# Patient Record
Sex: FEMALE | Race: WHITE | NOT HISPANIC OR LATINO | Employment: FULL TIME | URBAN - METROPOLITAN AREA
[De-identification: names, ages, dates, MRNs, and addresses within clinical notes are randomized per-mention and may not be internally consistent; named-entity substitution may affect disease eponyms.]

---

## 2018-01-30 ENCOUNTER — OFFICE VISIT CONVERTED (OUTPATIENT)
Dept: FAMILY MEDICINE CLINIC | Facility: CLINIC | Age: 38
End: 2018-01-30
Attending: NURSE PRACTITIONER

## 2018-02-27 ENCOUNTER — OFFICE VISIT CONVERTED (OUTPATIENT)
Dept: FAMILY MEDICINE CLINIC | Facility: CLINIC | Age: 38
End: 2018-02-27
Attending: NURSE PRACTITIONER

## 2018-02-27 ENCOUNTER — CONVERSION ENCOUNTER (OUTPATIENT)
Dept: FAMILY MEDICINE CLINIC | Facility: CLINIC | Age: 38
End: 2018-02-27

## 2018-10-22 ENCOUNTER — OFFICE VISIT CONVERTED (OUTPATIENT)
Dept: FAMILY MEDICINE CLINIC | Facility: CLINIC | Age: 38
End: 2018-10-22
Attending: NURSE PRACTITIONER

## 2019-01-15 ENCOUNTER — HOSPITAL ENCOUNTER (OUTPATIENT)
Dept: OTHER | Facility: HOSPITAL | Age: 39
Discharge: HOME | End: 2019-01-15

## 2019-04-30 ENCOUNTER — OFFICE VISIT CONVERTED (OUTPATIENT)
Dept: FAMILY MEDICINE CLINIC | Facility: CLINIC | Age: 39
End: 2019-04-30
Attending: NURSE PRACTITIONER

## 2019-11-11 ENCOUNTER — OFFICE VISIT CONVERTED (OUTPATIENT)
Dept: FAMILY MEDICINE CLINIC | Facility: CLINIC | Age: 39
End: 2019-11-11
Attending: NURSE PRACTITIONER

## 2020-01-02 ENCOUNTER — HOSPITAL ENCOUNTER (OUTPATIENT)
Dept: OTHER | Facility: HOSPITAL | Age: 40
Discharge: HOME | End: 2020-01-02

## 2020-05-04 ENCOUNTER — TELEPHONE CONVERTED (OUTPATIENT)
Dept: FAMILY MEDICINE CLINIC | Facility: CLINIC | Age: 40
End: 2020-05-04
Attending: NURSE PRACTITIONER

## 2020-06-04 ENCOUNTER — HOSPITAL ENCOUNTER (OUTPATIENT)
Dept: URGENT CARE | Facility: CLINIC | Age: 40
Discharge: HOME | End: 2020-06-04
Attending: NURSE PRACTITIONER

## 2020-06-05 LAB — SARS-COV-2 RNA SPEC QL NAA+PROBE: NOT DETECTED

## 2020-09-10 ENCOUNTER — HOSPITAL ENCOUNTER (OUTPATIENT)
Dept: OTHER | Facility: HOSPITAL | Age: 40
Discharge: HOME | End: 2020-09-10
Attending: NURSE PRACTITIONER

## 2020-09-11 LAB
CONV MUMPS ANTIBODY IGG: <9 AU/ML
HBV SURFACE AB SER QL: REACTIVE
MEV IGG SER IA-ACNC: 15.7 AU/ML
RUBV IGG SER-ACNC: 31.5 [IU]/ML
VZV IGG SER IA-ACNC: >4000 INDEX

## 2020-11-02 ENCOUNTER — OFFICE VISIT CONVERTED (OUTPATIENT)
Dept: FAMILY MEDICINE CLINIC | Facility: CLINIC | Age: 40
End: 2020-11-02
Attending: NURSE PRACTITIONER

## 2021-01-11 ENCOUNTER — HOSPITAL ENCOUNTER (OUTPATIENT)
Dept: LAB | Facility: HOSPITAL | Age: 41
Discharge: HOME | End: 2021-01-11

## 2021-01-11 LAB
ALBUMIN SERPL-MCNC: 4.1 G/DL (ref 3.5–5)
ALBUMIN/GLOB SERPL: 1.6 {RATIO} (ref 1.4–2.6)
ALP SERPL-CCNC: 38 U/L (ref 42–98)
ALT SERPL-CCNC: 8 U/L (ref 10–40)
ANION GAP SERPL CALC-SCNC: 13 MMOL/L (ref 8–19)
AST SERPL-CCNC: 11 U/L (ref 15–50)
BASOPHILS # BLD AUTO: 0.04 10*3/UL (ref 0–0.2)
BASOPHILS NFR BLD AUTO: 1 % (ref 0–3)
BILIRUB SERPL-MCNC: 0.33 MG/DL (ref 0.2–1.3)
BUN SERPL-MCNC: 10 MG/DL (ref 5–25)
BUN/CREAT SERPL: 11 {RATIO} (ref 6–20)
CALCIUM SERPL-MCNC: 8.6 MG/DL (ref 8.7–10.4)
CHLORIDE SERPL-SCNC: 108 MMOL/L (ref 99–111)
CHOLEST SERPL-MCNC: 182 MG/DL (ref 107–200)
CHOLEST/HDLC SERPL: 2.8 {RATIO} (ref 3–6)
CONV ABS IMM GRAN: 0.01 10*3/UL (ref 0–0.2)
CONV CO2: 23 MMOL/L (ref 22–32)
CONV IMMATURE GRAN: 0.2 % (ref 0–1.8)
CONV TOTAL PROTEIN: 6.6 G/DL (ref 6.3–8.2)
CREAT UR-MCNC: 0.89 MG/DL (ref 0.5–0.9)
DEPRECATED RDW RBC AUTO: 43.2 FL (ref 36.4–46.3)
EOSINOPHIL # BLD AUTO: 0.03 10*3/UL (ref 0–0.7)
EOSINOPHIL # BLD AUTO: 0.7 % (ref 0–7)
ERYTHROCYTE [DISTWIDTH] IN BLOOD BY AUTOMATED COUNT: 12.4 % (ref 11.7–14.4)
GFR SERPLBLD BASED ON 1.73 SQ M-ARVRAT: >60 ML/MIN/{1.73_M2}
GLOBULIN UR ELPH-MCNC: 2.5 G/DL (ref 2–3.5)
GLUCOSE SERPL-MCNC: 91 MG/DL (ref 65–99)
HCT VFR BLD AUTO: 35.4 % (ref 37–47)
HDLC SERPL-MCNC: 66 MG/DL (ref 40–60)
HGB BLD-MCNC: 11.8 G/DL (ref 12–16)
LDLC SERPL CALC-MCNC: 105 MG/DL (ref 70–100)
LYMPHOCYTES # BLD AUTO: 1.64 10*3/UL (ref 1–5)
LYMPHOCYTES NFR BLD AUTO: 40.6 % (ref 20–45)
MCH RBC QN AUTO: 32.2 PG (ref 27–31)
MCHC RBC AUTO-ENTMCNC: 33.3 G/DL (ref 33–37)
MCV RBC AUTO: 96.5 FL (ref 81–99)
MONOCYTES # BLD AUTO: 0.41 10*3/UL (ref 0.2–1.2)
MONOCYTES NFR BLD AUTO: 10.1 % (ref 3–10)
NEUTROPHILS # BLD AUTO: 1.91 10*3/UL (ref 2–8)
NEUTROPHILS NFR BLD AUTO: 47.4 % (ref 30–85)
NRBC CBCN: 0 % (ref 0–0.7)
OSMOLALITY SERPL CALC.SUM OF ELEC: 289 MOSM/KG (ref 273–304)
PLATELET # BLD AUTO: 173 10*3/UL (ref 130–400)
PMV BLD AUTO: 12.5 FL (ref 9.4–12.3)
POTASSIUM SERPL-SCNC: 3.5 MMOL/L (ref 3.5–5.3)
RBC # BLD AUTO: 3.67 10*6/UL (ref 4.2–5.4)
SODIUM SERPL-SCNC: 140 MMOL/L (ref 135–147)
TRIGL SERPL-MCNC: 56 MG/DL (ref 40–150)
TSH SERPL-ACNC: 2.98 M[IU]/L (ref 0.27–4.2)
VLDLC SERPL-MCNC: 11 MG/DL (ref 5–37)
WBC # BLD AUTO: 4.04 10*3/UL (ref 4.8–10.8)

## 2021-05-13 NOTE — PROGRESS NOTES
Progress Note      Patient Name: Marie Stout   Patient ID: [id number]   Sex: Female   YOB: 1980    Primary Care Provider: Jackie CARRION    Visit Date: November 2, 2020    Provider: SHEYLA Rocha   Location: [facility]   Location Address: [address]  [place], [place]  [id number]   Location Phone: [phone]          Chief Complaint     Follow up and med refills       History Of Present Illness  Marie Stout is a 40 year old /White female who presents for evaluation and treatment of:      She is doing good.  She is at [facility] and feels good (registering)  She is doing well with the Wellbutrin at the low dose.  She gets overwhelms at times.    She has not had her labs--she will get her labs done in Jan.    She runs for peace.  She is going to the the marathon.    She is doing good with headaches.  No issues.  She stopped school for resp due to the stress.  She is off birth control due to the vacation.  She stopped and feels good.       Past Medical History  Disease Name Date Onset Notes   Anxiety --  --    Depression --  --    Generalized anxiety disorder 10/22/2018 --    Major depressive disorder 10/22/2018 --    Migraine headache --  --          Medication List  Name Date Started Instructions   bupropion HCl 100 mg oral tablet 11/02/2020 TAKE 1 TABLET BY MOUTH THREE TIMES DAILY for 30 days   diolex 250mg 250 mg  1 tab po bid   Keppra 500 mg oral tablet  take 3 tablets by oral route daily   Klonopin 0.5 mg oral tablet  take 1 tablet (0.5 mg) by oral route 2 times per day   Topamax 100 mg oral tablet  take 1 tablet (100 mg) by oral route 2 times per day         Allergy List  Allergen Name Date Reaction Notes   NO KNOWN DRUG ALLERGIES --  --  --        Allergies Reconciled  Family Medical History  Disease Name Relative/Age Notes   Diabetes Mellitus, Type II Father/  Mother/   --          Reproductive History  Menstrual   Pregnancy Summary  "  Total Pregnancies: 2 Full Term: 0 Premature: 0   Ab Induced: 0 Ab Spontaneous: 0 Ectopics: 0   Multiples: 0 Livin         Social History  Finding Status Start/Stop Quantity Notes   Alcohol Never --/-- --  --    Exercises regularly --  --/-- --  --    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[facility]    --  --/-- --  --    Non-smoker --  --/-- --  2018 -    Tobacco Never --/-- --  --          Immunizations  NameDate Admin Mfg Trade Name Lot Number Route Inj VIS Given VIS Publication   [id number] SKB Fluarix, quadrivalent, preservative free  NE NE 2020    Comments:          Review of Systems  · Constitutional  o Denies  o : fever, fatigue, weight loss, weight gain  · Cardiovascular  o Denies  o : lower extremity edema, claudication, chest pressure, palpitations  · Respiratory  o Denies  o : shortness of breath, wheezing, cough, hemoptysis, dyspnea on exertion  · Gastrointestinal  o Denies  o : nausea, vomiting, diarrhea, constipation, abdominal pain  · Psychiatric  o Admits  o : anxiety, depression  o Denies  o : suicidal ideation, homicidal ideation      Vitals  Date Time BP Position Site L\R Cuff Size HR RR TEMP (F) WT  HT  BMI kg/m2 BSA m2 O2 Sat FR L/min FiO2        2020 08:40 /72 Sitting    82 - R 12 97.8 110lbs 8oz 5'  3\" 19.57 1.49 100 %            Physical Examination  · Constitutional  o Appearance  o : well-nourished, well developed, alert, in no acute distress  · Respiratory  o Respiratory Effort  o : breathing unlabored  o Auscultation of Lungs  o : normal breath sounds throughout  · Cardiovascular  o Heart  o :   § Auscultation of Heart  § : regular rate and rhythm, no murmurs, gallops or rubs  § Palpation of Heart  § : normal apical impulse, no cardiac thrill present  · Neurologic  o Mental Status Examination  o :   § Orientation  § : grossly oriented to person, place and time  · Psychiatric  o Mood and Affect  o : mood normal, affect appropriate, denies any " SI/HI          Assessment  · Generalized anxiety disorder     300.02/F41.1  · Mild episode of recurrent major depressive disorder     296.31/F33.0  · Screening for depression     V79.0/Z13.89      Plan  · Orders  o ACO-18: Negative screen for clinical depression using a standardized tool () - V79.0/Z13.89 - 11/02/2020  o ACO-39: Current medications updated and reviewed () - - 11/02/2020  o ACO-14: Influenza immunization administered or previously received () - - 11/02/2020  · Medications  o bupropion HCl 100 mg oral tablet   SIG: TAKE 1 TABLET BY MOUTH THREE TIMES DAILY for 30 days   DISP: (90) Tablet with 5 refills  Refilled on 11/02/2020     · Instructions  o Depression Screen completed and scanned into the EMR under the designated folder within the patient's documents.  o PHQ-9 results 3 stable on meds.  o Patient was educated/instructed on their diagnosis, treatment and medications prior to discharge from the clinic today.  o Patient instructed to seek medical attention urgently for new or worsening symptoms.  o Call the office with any concerns or questions.  o Call with any concerns or questions. F.U in 6 months. Any SI/HI seek help immed. She declines pap.   o Electronically Identified Patient Education Materials Provided Electronically  · Disposition  o Call or Return if symptoms worsen or persist.  o Return Visit Request in/on 6 months +/- 2 days ([id number]).            Electronically Signed by: SHEYLA Rocha -Author on November 2, 2020 09:05:02 AM

## 2021-05-13 NOTE — PROGRESS NOTES
Quick Note      Patient Name: Marie Stout   Patient ID: [id number]   Sex: Female   YOB: 1980    Primary Care Provider: Jackie CARRION    Visit Date: May 4, 2020    Provider: SHEYLA Rocha   Location: Replaced by [facility]   Location Address: [facility] [address]  [place], [place]  [id number]   Location Phone: [phone]          History Of Present Illness  TELEHEALTH TELEPHONE VISIT  Chief Complaint: f.u for med   Marie Stout is a 40 year old /White female who is presenting for evaluation via telehealth telephone visit. Verbal consent obtained before beginning visit.   Provider spent 9:40-9:52 minutes with the patient during telehealth visit.   The following staff were present during this visit: pt, and self(ANTONIOW)   Past Medical History/Overview of Patient Symptoms  Marie Stout is a 40 year old /White female who presents for evaluation and treatment of:      She is very active.  She is taking her birth control and she runs.  She knows that her bc is working but she may have a light cycle one month and then ok the next.  She is an avid runner.  She is doing well with the Wellbutrin.  NO issues and no SI/HI Noted.  She is seeing Dr Aldrich in [place] for her refills.  She has not had any seizure activity in the last 1 yr. No headache issues noted.  She feels good overall.           Assessment  · Generalized anxiety disorder     300.02/F41.1  · Mild episode of recurrent major depressive disorder     296.31/F33.0  · Birth control counseling     V25.09/Z30.09      Plan  · Orders  o Physician Telephone Evaluation, 11-20 minutes ([id number]) - - 05/04/2020  o ACO-39: Current medications updated and reviewed () - - 05/04/2020  o ACO-14: Influenza immunization administered or previously received () - - 05/04/2020  · Medications  o bupropion HCl 100 mg oral tablet   SIG: TAKE 1 TABLET BY MOUTH THREE TIMES DAILY for 30 days   DISP: (90) Tablet with 5 refills  Refilled on  05/04/2020     o Tri-Lo-Sprintec 0.18/0.215/0.25 mg-25 mcg oral tablet   SIG: TAKE 1 TABLET BY MOUTH EVERY DAY   DISP: (28) Tablet with 5 refills  Refilled on 05/04/2020     o buspirone 5 mg oral tablet   SIG: take 1 tablet by oral route daily as needed   DISP: (30) tablets with 5 refills  Discontinued on 05/04/2020     o Medications have been Reconciled  o Transition of Care or Provider Policy  · Instructions  o Plan Of Care:   o Take all medications as prescribed/directed.  o Call the office with any concerns or questions.  o She will do pap in 6 months. We will do labs (fasting if she wants) in 6 months. She is due cmp/cbc due for Birth control. Call with any concerns or questions. She is still seeing neuro and is due in June to see him.   o Electronically Identified Patient Education Materials Provided Electronically  · Disposition  o Call or Return if symptoms worsen or persist.  o Return Visit Request in/on 6 months +/- 2 days ([id number]).            Electronically Signed by: SHEYLA Rocha -Author on May 4, 2020 09:54:43 AM

## 2021-05-14 VITALS
SYSTOLIC BLOOD PRESSURE: 109 MMHG | TEMPERATURE: 97.8 F | RESPIRATION RATE: 12 BRPM | DIASTOLIC BLOOD PRESSURE: 72 MMHG | HEIGHT: 63 IN | WEIGHT: 110.5 LBS | OXYGEN SATURATION: 100 % | BODY MASS INDEX: 19.58 KG/M2 | HEART RATE: 82 BPM

## 2021-05-15 VITALS
TEMPERATURE: 98.6 F | SYSTOLIC BLOOD PRESSURE: 114 MMHG | HEART RATE: 79 BPM | DIASTOLIC BLOOD PRESSURE: 80 MMHG | RESPIRATION RATE: 12 BRPM | OXYGEN SATURATION: 100 % | HEIGHT: 63 IN | WEIGHT: 111.06 LBS | BODY MASS INDEX: 19.68 KG/M2

## 2021-05-15 VITALS
WEIGHT: 107.19 LBS | HEIGHT: 63 IN | RESPIRATION RATE: 12 BRPM | OXYGEN SATURATION: 100 % | HEART RATE: 76 BPM | DIASTOLIC BLOOD PRESSURE: 82 MMHG | BODY MASS INDEX: 18.99 KG/M2 | SYSTOLIC BLOOD PRESSURE: 110 MMHG | TEMPERATURE: 98.2 F

## 2021-05-16 VITALS
RESPIRATION RATE: 12 BRPM | BODY MASS INDEX: 19.36 KG/M2 | SYSTOLIC BLOOD PRESSURE: 108 MMHG | BODY MASS INDEX: 20.23 KG/M2 | SYSTOLIC BLOOD PRESSURE: 114 MMHG | HEIGHT: 63 IN | DIASTOLIC BLOOD PRESSURE: 76 MMHG | SYSTOLIC BLOOD PRESSURE: 114 MMHG | WEIGHT: 114.19 LBS | WEIGHT: 109.25 LBS | OXYGEN SATURATION: 100 % | TEMPERATURE: 98.9 F | BODY MASS INDEX: 19.57 KG/M2 | OXYGEN SATURATION: 100 % | HEIGHT: 63 IN | OXYGEN SATURATION: 100 % | HEART RATE: 72 BPM | RESPIRATION RATE: 16 BRPM | DIASTOLIC BLOOD PRESSURE: 54 MMHG | HEART RATE: 81 BPM | TEMPERATURE: 98.6 F | WEIGHT: 110.44 LBS | HEIGHT: 63 IN | DIASTOLIC BLOOD PRESSURE: 76 MMHG | HEART RATE: 80 BPM | RESPIRATION RATE: 12 BRPM | TEMPERATURE: 98.4 F

## 2021-06-02 ENCOUNTER — OFFICE VISIT CONVERTED (OUTPATIENT)
Dept: FAMILY MEDICINE CLINIC | Facility: CLINIC | Age: 41
End: 2021-06-02
Attending: NURSE PRACTITIONER

## 2021-06-02 ENCOUNTER — CONVERSION ENCOUNTER (OUTPATIENT)
Dept: FAMILY MEDICINE CLINIC | Facility: CLINIC | Age: 41
End: 2021-06-02

## 2021-06-05 NOTE — PROGRESS NOTES
Progress Note      Patient Name: Marie Stout   Patient ID: [id number]   Sex: Female   YOB: 1980    Primary Care Provider: Jackie CARRION    Visit Date: June 2, 2021    Provider: SHEYLA Rocha   Location: [facility]   Location Address: [address]  [place], [place]  [id number]   Location Phone: [phone]          Chief Complaint     Follow up and med refills       History Of Present Illness  Marie Stout is a 41 year old /White female who presents for evaluation and treatment of:      She is doing good.  She is at [facility] and feels good (registering)  She is doing well with the Wellbutrin at the low dose.  She gets overwhelms but the med is doing good.  I reviewed labs overall ok.    She runs for peace.  She is going to the the marathon.    She is doing good with headaches.  No issues.  Her cycles are good.  She did get covid vaccine.       Past Medical History  Disease Name Date Onset Notes   Anxiety --  --    Depression --  --    Generalized anxiety disorder 10/22/2018 --    Major depressive disorder 10/22/2018 --    Migraine headache --  --    Mild episode of recurrent major depressive disorder 11/02/2020 --          Medication List  Name Date Started Instructions   bupropion HCl 100 mg oral tablet 11/02/2020 TAKE 1 TABLET BY MOUTH THREE TIMES DAILY for 30 days   diolex 250mg 250 mg  1 tab po bid   Keppra 500 mg oral tablet  take 3 tablets by oral route daily   Klonopin 0.5 mg oral tablet  take 1 tablet (0.5 mg) by oral route 2 times per day   Topamax 100 mg oral tablet  take 1 tablet (100 mg) by oral route 2 times per day         Allergy List  Allergen Name Date Reaction Notes   NO KNOWN DRUG ALLERGIES --  --  --        Allergies Reconciled  Family Medical History  Disease Name Relative/Age Notes   Diabetes Mellitus, Type II Father/  Mother/   --          Reproductive History  Menstrual   Pregnancy Summary   Total Pregnancies: 2  "Full Term: 0 Premature: 0   Ab Induced: 0 Ab Spontaneous: 0 Ectopics: 0   Multiples: 0 Livin         Social History  Finding Status Start/Stop Quantity Notes   Alcohol Never --/-- --  --    Exercises regularly --  --/-- --  --    Health care --  --/-- --  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[employer] [facility]    --  --/-- --  --    Non-smoker --  --/-- --  2018 -    Tobacco Never --/-- --  --          Immunizations  NameDate Admin Mfg Trade Name Lot Number Route Inj VIS Given VIS Publication   [id number] SKB Fluarix, quadrivalent, preservative free  NE NE 2020    Comments:          Review of Systems  · Constitutional  o Denies  o : fever, fatigue, weight loss, weight gain  · Cardiovascular  o Denies  o : lower extremity edema, claudication, chest pressure, palpitations  · Respiratory  o Denies  o : shortness of breath, wheezing, cough, hemoptysis, dyspnea on exertion  · Gastrointestinal  o Denies  o : nausea, vomiting, diarrhea, constipation, abdominal pain  · Psychiatric  o Admits  o : anxiety, depression  o Denies  o : suicidal ideation, homicidal ideation      Vitals  Date Time BP Position Site L\R Cuff Size HR RR TEMP (F) WT  HT  BMI kg/m2 BSA m2 O2 Sat FR L/min FiO2 HC       2020 08:40 /72 Sitting    82 - R 12 97.8 110lbs 8oz 5'  3\" 19.57 1.49 100 %      2021 11:08 /79 Sitting    82 - R 16 97.8 112lbs 4oz 5'  3\" 19.88 1.5 99 %  21%          Physical Examination  · Constitutional  o Appearance  o : well-nourished, well developed, alert, in no acute distress  · Respiratory  o Respiratory Effort  o : breathing unlabored  o Auscultation of Lungs  o : normal breath sounds throughout  · Cardiovascular  o Heart  o :   § Auscultation of Heart  § : regular rate and rhythm, no murmurs, gallops or rubs  § Palpation of Heart  § : normal apical impulse, no cardiac thrill present  · Neurologic  o Mental Status Examination  o :   § Orientation  § : grossly oriented to person, place and " time  · Psychiatric  o Mood and Affect  o : mood normal, affect appropriate, denies any SI/HI          Assessment  · Generalized anxiety disorder     300.02/F41.1  · Mild episode of recurrent major depressive disorder     296.31/F33.0      Plan  · Orders  o ACO-39: Current medications updated and reviewed () - - 2021  o ACO-14: Influenza immunization administered or previously received () - - 2021  · Medications  o bupropion HCl 100 mg oral tablet   SIG: TAKE 1 TABLET BY MOUTH THREE TIMES DAILY for 30 days   DISP: (90) Tablet with 5 refills  Refilled on 2021     o diolex 250mg 250 mg   SI tab po bid   DISP: # 0 with 0 refills  Discontinued on 2021     o Medications have been Reconciled  o Transition of Care or Provider Policy  · Instructions  o Patient was educated/instructed on their diagnosis, treatment and medications prior to discharge from the clinic today.  o Patient instructed to seek medical attention urgently for new or worsening symptoms.  o Call the office with any concerns or questions.  o Call with any concerns or questions. F.U in 6 months. Any SI/HI seek help immed. She declines pap. I have reviewed all medications and at this time no medications changes need to be adjusted for all chronic conditions.  · Disposition  o Call or Return if symptoms worsen or persist.  o Return Visit Request in/on 6 months +/- 2 days ([id number]).            Electronically Signed by: SHEYLA Rocha -Author on 2021 11:21:17 AM

## 2021-07-15 VITALS
RESPIRATION RATE: 16 BRPM | TEMPERATURE: 97.8 F | BODY MASS INDEX: 19.89 KG/M2 | HEIGHT: 63 IN | DIASTOLIC BLOOD PRESSURE: 79 MMHG | SYSTOLIC BLOOD PRESSURE: 127 MMHG | OXYGEN SATURATION: 99 % | HEART RATE: 82 BPM | WEIGHT: 112.25 LBS

## 2021-08-15 PROCEDURE — U0003 INFECTIOUS AGENT DETECTION BY NUCLEIC ACID (DNA OR RNA); SEVERE ACUTE RESPIRATORY SYNDROME CORONAVIRUS 2 (SARS-COV-2) (CORONAVIRUS DISEASE [COVID-19]), AMPLIFIED PROBE TECHNIQUE, MAKING USE OF HIGH THROUGHPUT TECHNOLOGIES AS DESCRIBED BY CMS-2020-01-R: HCPCS | Performed by: NURSE PRACTITIONER

## 2021-08-15 PROCEDURE — 87081 CULTURE SCREEN ONLY: CPT | Performed by: NURSE PRACTITIONER

## 2021-08-18 ENCOUNTER — TELEPHONE (OUTPATIENT)
Dept: URGENT CARE | Facility: CLINIC | Age: 41
End: 2021-08-18

## 2021-08-18 NOTE — TELEPHONE ENCOUNTER
----- Message from Codie Barnes MD sent at 8/17/2021  9:55 AM EDT -----  Please call the patient regarding her negative strep culture.

## 2021-08-19 PROCEDURE — U0003 INFECTIOUS AGENT DETECTION BY NUCLEIC ACID (DNA OR RNA); SEVERE ACUTE RESPIRATORY SYNDROME CORONAVIRUS 2 (SARS-COV-2) (CORONAVIRUS DISEASE [COVID-19]), AMPLIFIED PROBE TECHNIQUE, MAKING USE OF HIGH THROUGHPUT TECHNOLOGIES AS DESCRIBED BY CMS-2020-01-R: HCPCS | Performed by: NURSE PRACTITIONER

## 2021-08-23 ENCOUNTER — TELEPHONE (OUTPATIENT)
Dept: URGENT CARE | Facility: CLINIC | Age: 41
End: 2021-08-23

## 2021-08-23 NOTE — TELEPHONE ENCOUNTER
----- Message from Codie Barnes MD sent at 8/22/2021  2:09 PM EDT -----  Please call the patient regarding her negative Covid PCR test.

## 2021-10-06 ENCOUNTER — TELEPHONE (OUTPATIENT)
Dept: ONCOLOGY | Facility: OTHER | Age: 41
End: 2021-10-06

## 2021-10-06 NOTE — TELEPHONE ENCOUNTER
PT CALLED REQUESTING AN ANNUAL EXAM WITH DR. KUNZ BUT SHE IS NOT AN ONCOLOGY PATIENT IS HAVING NO PROBLEMS.  REFERRED PATIENT TO [email or web address].

## 2021-11-09 ENCOUNTER — OFFICE VISIT (OUTPATIENT)
Dept: FAMILY MEDICINE CLINIC | Facility: CLINIC | Age: 41
End: 2021-11-09

## 2021-11-09 VITALS
TEMPERATURE: 97.8 F | DIASTOLIC BLOOD PRESSURE: 70 MMHG | SYSTOLIC BLOOD PRESSURE: 108 MMHG | OXYGEN SATURATION: 99 % | HEART RATE: 75 BPM | HEIGHT: 62 IN | WEIGHT: 111.9 LBS | BODY MASS INDEX: 20.59 KG/M2 | RESPIRATION RATE: 16 BRPM

## 2021-11-09 DIAGNOSIS — R56.9 SEIZURE (HCC): ICD-10-CM

## 2021-11-09 DIAGNOSIS — F41.1 GENERALIZED ANXIETY DISORDER: ICD-10-CM

## 2021-11-09 DIAGNOSIS — Z86.39 HISTORY OF VITAMIN D DEFICIENCY: ICD-10-CM

## 2021-11-09 DIAGNOSIS — F33.1 MODERATE EPISODE OF RECURRENT MAJOR DEPRESSIVE DISORDER (HCC): Primary | ICD-10-CM

## 2021-11-09 DIAGNOSIS — D64.9 LOW HEMOGLOBIN: ICD-10-CM

## 2021-11-09 PROBLEM — F32.9 MAJOR DEPRESSIVE DISORDER: Status: ACTIVE | Noted: 2018-10-22

## 2021-11-09 LAB
25(OH)D3 SERPL-MCNC: 62 NG/ML (ref 30–100)
ALBUMIN SERPL-MCNC: 4.4 G/DL (ref 3.5–5.2)
ALBUMIN/GLOB SERPL: 1.6 G/DL
ALP SERPL-CCNC: 40 U/L (ref 39–117)
ALT SERPL W P-5'-P-CCNC: 13 U/L (ref 1–33)
ANION GAP SERPL CALCULATED.3IONS-SCNC: 8.7 MMOL/L (ref 5–15)
AST SERPL-CCNC: 20 U/L (ref 1–32)
BASOPHILS # BLD AUTO: 0.03 10*3/MM3 (ref 0–0.2)
BASOPHILS NFR BLD AUTO: 0.6 % (ref 0–1.5)
BILIRUB SERPL-MCNC: 0.3 MG/DL (ref 0–1.2)
BUN SERPL-MCNC: 12 MG/DL (ref 6–20)
BUN/CREAT SERPL: 16 (ref 7–25)
CALCIUM SPEC-SCNC: 8.7 MG/DL (ref 8.6–10.5)
CHLORIDE SERPL-SCNC: 106 MMOL/L (ref 98–107)
CO2 SERPL-SCNC: 23.3 MMOL/L (ref 22–29)
CREAT SERPL-MCNC: 0.75 MG/DL (ref 0.57–1)
DEPRECATED RDW RBC AUTO: 43 FL (ref 37–54)
EOSINOPHIL # BLD AUTO: 0.03 10*3/MM3 (ref 0–0.4)
EOSINOPHIL NFR BLD AUTO: 0.6 % (ref 0.3–6.2)
ERYTHROCYTE [DISTWIDTH] IN BLOOD BY AUTOMATED COUNT: 12.9 % (ref 12.3–15.4)
FERRITIN SERPL-MCNC: 26.3 NG/ML (ref 13–150)
FOLATE SERPL-MCNC: 18 NG/ML (ref 4.78–24.2)
GFR SERPL CREATININE-BSD FRML MDRD: 86 ML/MIN/1.73
GLOBULIN UR ELPH-MCNC: 2.7 GM/DL
GLUCOSE SERPL-MCNC: 87 MG/DL (ref 65–99)
HCT VFR BLD AUTO: 30.3 % (ref 34–46.6)
HGB BLD-MCNC: 10.3 G/DL (ref 12–15.9)
IMM GRANULOCYTES # BLD AUTO: 0.01 10*3/MM3 (ref 0–0.05)
IMM GRANULOCYTES NFR BLD AUTO: 0.2 % (ref 0–0.5)
IRON 24H UR-MRATE: 129 MCG/DL (ref 37–145)
IRON SATN MFR SERPL: 30 % (ref 20–50)
LYMPHOCYTES # BLD AUTO: 2.02 10*3/MM3 (ref 0.7–3.1)
LYMPHOCYTES NFR BLD AUTO: 41.4 % (ref 19.6–45.3)
MCH RBC QN AUTO: 31.2 PG (ref 26.6–33)
MCHC RBC AUTO-ENTMCNC: 34 G/DL (ref 31.5–35.7)
MCV RBC AUTO: 91.8 FL (ref 79–97)
MONOCYTES # BLD AUTO: 0.41 10*3/MM3 (ref 0.1–0.9)
MONOCYTES NFR BLD AUTO: 8.4 % (ref 5–12)
NEUTROPHILS NFR BLD AUTO: 2.38 10*3/MM3 (ref 1.7–7)
NEUTROPHILS NFR BLD AUTO: 48.8 % (ref 42.7–76)
NRBC BLD AUTO-RTO: 0 /100 WBC (ref 0–0.2)
PLATELET # BLD AUTO: 230 10*3/MM3 (ref 140–450)
PMV BLD AUTO: 12.9 FL (ref 6–12)
POTASSIUM SERPL-SCNC: 3.9 MMOL/L (ref 3.5–5.2)
PROT SERPL-MCNC: 7.1 G/DL (ref 6–8.5)
RBC # BLD AUTO: 3.3 10*6/MM3 (ref 3.77–5.28)
SODIUM SERPL-SCNC: 138 MMOL/L (ref 136–145)
TIBC SERPL-MCNC: 434 MCG/DL (ref 298–536)
TRANSFERRIN SERPL-MCNC: 291 MG/DL (ref 200–360)
TSH SERPL DL<=0.05 MIU/L-ACNC: 2.69 UIU/ML (ref 0.27–4.2)
VIT B12 BLD-MCNC: >2000 PG/ML (ref 211–946)
WBC # BLD AUTO: 4.88 10*3/MM3 (ref 3.4–10.8)

## 2021-11-09 PROCEDURE — 82607 VITAMIN B-12: CPT | Performed by: NURSE PRACTITIONER

## 2021-11-09 PROCEDURE — 84443 ASSAY THYROID STIM HORMONE: CPT | Performed by: NURSE PRACTITIONER

## 2021-11-09 PROCEDURE — 82746 ASSAY OF FOLIC ACID SERUM: CPT | Performed by: NURSE PRACTITIONER

## 2021-11-09 PROCEDURE — 82728 ASSAY OF FERRITIN: CPT | Performed by: NURSE PRACTITIONER

## 2021-11-09 PROCEDURE — 80053 COMPREHEN METABOLIC PANEL: CPT | Performed by: NURSE PRACTITIONER

## 2021-11-09 PROCEDURE — 99213 OFFICE O/P EST LOW 20 MIN: CPT | Performed by: NURSE PRACTITIONER

## 2021-11-09 PROCEDURE — 85025 COMPLETE CBC W/AUTO DIFF WBC: CPT | Performed by: NURSE PRACTITIONER

## 2021-11-09 PROCEDURE — 84466 ASSAY OF TRANSFERRIN: CPT | Performed by: NURSE PRACTITIONER

## 2021-11-09 PROCEDURE — 82306 VITAMIN D 25 HYDROXY: CPT | Performed by: NURSE PRACTITIONER

## 2021-11-09 PROCEDURE — 83540 ASSAY OF IRON: CPT | Performed by: NURSE PRACTITIONER

## 2021-11-09 RX ORDER — BUPROPION HYDROCHLORIDE 100 MG/1
100 TABLET ORAL 3 TIMES DAILY
COMMUNITY
End: 2021-11-09 | Stop reason: SDUPTHER

## 2021-11-09 RX ORDER — BUPROPION HYDROCHLORIDE 100 MG/1
100 TABLET ORAL 3 TIMES DAILY
Qty: 90 TABLET | Refills: 5 | Status: SHIPPED | OUTPATIENT
Start: 2021-11-09 | End: 2022-02-01 | Stop reason: SDUPTHER

## 2021-11-09 NOTE — PROGRESS NOTES
Chief Complaint  Med Refill (needs wellbutrin)    Subjective      History of Present Illness  Marie Stout presents to [facility]  She was at the neurologist and he rx lexapro due to the s/e that wellbutrin could pose with her hx of sz.  She didn't  the rx.  She wants to be back on wellbutrin.  She has been sz since 18 and knows that it is more sleep deprivation and not taking her meds correctly will give her a sz.  Her last sz was July 5th and she was sleep deprived.  She was by herself--she doesn't know how long she was down but her  knew that she was having one and she had the person in the driveway and helped her.  She has been out of the wellbutrin since Aug/Sept.  The cymbalta causes you to very sleepy.  It will cause her head to hurt too.      Past Medical History:   • Anxiety   • Depression   • Generalized anxiety disorder   • Major depressive disorder   • Migraine headache   • Mild episode of recurrent major depressive disorder (HCC)   • Seizures (HCC)    JULY 5TH LAST KNOWN SEIZURE       Allergies  Patient has no known allergies.    No past surgical history on file.    Social History     Tobacco Use   • Smoking status: Never Smoker   • Smokeless tobacco: Never Used   Vaping Use   • Vaping Use: Never used   Substance Use Topics   • Alcohol use: Never   • Drug use: Never       Family History   Problem Relation Age of Onset   • Stroke Mother    • Hypertension Mother    • Hyperlipidemia Mother    • Diabetes Mother    • Diabetes type II Mother    • Diabetes Father    • Diabetes type II Father         Health Maintenance Due   Topic Date Due   • ANNUAL PHYSICAL  Never done   • HEPATITIS C SCREENING  Never done   • PAP SMEAR  Never done          Current Outpatient Medications:   •  buPROPion (WELLBUTRIN) 100 MG tablet, Take 1 tablet by mouth 3 (Three) Times a Day., Disp: 90 tablet, Rfl: 5  •  clonazePAM (KlonoPIN) 0.5 MG tablet, , Disp: , Rfl:   •  levETIRAcetam  (KEPPRA) 500 MG tablet, Take 1 tablet by mouth 4 (Four) Times a Day., Disp: 540 tablet, Rfl: 0  •  pramipexole (MIRAPEX) 0.5 MG tablet, Take 1 tablet by mouth daily., Disp: 90 tablet, Rfl: 3  •  topiramate (TOPAMAX) 100 MG tablet, Take 1 tablet by mouth 2 (two) times a day., Disp: 180 tablet, Rfl: 1    Immunization History   Administered Date(s) Administered   • COVID-19 (MODERNA) 05/30/2021, 06/27/2021   • FluLaval/Fluarix/Fluzone >6 10/12/2021   • Influenza, Unspecified 10/01/2018, 10/01/2019, 10/01/2020   • Tdap 01/23/2020       Objective     Vitals:    11/09/21 1553   BP: 108/70   Pulse: 75   Resp: 16   Temp: 97.8 °F (36.6 °C)   SpO2: 99%     Body mass index is 20.47 kg/m².     Review of Systems    Physical Exam  Vitals reviewed.   Constitutional:       Appearance: Normal appearance. She is well-developed.   Cardiovascular:      Rate and Rhythm: Normal rate and regular rhythm.      Heart sounds: Normal heart sounds. No murmur heard.      Pulmonary:      Effort: Pulmonary effort is normal.      Breath sounds: Normal breath sounds.   Neurological:      Mental Status: She is alert and oriented to person, place, and time.      Cranial Nerves: No cranial nerve deficit.      Motor: No weakness.   Psychiatric:         Mood and Affect: Mood and affect normal.             Result Review :           Depression: Not at risk   • PHQ-2 Score: 1                         Assessment and Plan     Diagnoses and all orders for this visit:    1. Moderate episode of recurrent major depressive disorder (HCC) (Primary)  -     Comprehensive Metabolic Panel  -     Vitamin B12  -     Folate  -     Ferritin  -     Iron Profile    2. Generalized anxiety disorder  -     CBC & Differential  -     TSH  -     Vitamin B12  -     Folate  -     buPROPion (WELLBUTRIN) 100 MG tablet; Take 1 tablet by mouth 3 (Three) Times a Day.  Dispense: 90 tablet; Refill: 5  -     Ferritin  -     Iron Profile    3. History of vitamin D deficiency  -     Vitamin D  25 Hydroxy  -     Vitamin B12  -     Folate    4. Seizure (HCC)  -     Vitamin B12            Follow Up     Return in about 3 months (around 2/9/2022).    Patient was given instructions and counseling regarding her condition or for health maintenance advice. Please see specific information pulled into the AVS if appropriate.     She knows the risk vs benefits of wellbutrin with sz.  Taper up the dose of wellbutrin over a 3 weeks time frame.  Keep me posted.  Call with any concerns or questions.    F/U in 3mth and see how everything is going.    SHEYLA Rocha             Answers for HPI/ROS submitted by the patient on 11/9/2021  Please describe your symptoms.: Medication refil  Have you had these symptoms before?: Yes  How long have you been having these symptoms?: Greater than 2 weeks  What is the primary reason for your visit?: Other

## 2021-11-10 LAB — HEMOCCULT STL QL IA: NEGATIVE

## 2021-11-10 PROCEDURE — 82274 ASSAY TEST FOR BLOOD FECAL: CPT | Performed by: NURSE PRACTITIONER

## 2021-12-07 ENCOUNTER — OFFICE VISIT (OUTPATIENT)
Dept: OBSTETRICS AND GYNECOLOGY | Facility: CLINIC | Age: 41
End: 2021-12-07

## 2021-12-07 VITALS
WEIGHT: 112 LBS | BODY MASS INDEX: 20.61 KG/M2 | HEIGHT: 62 IN | HEART RATE: 90 BPM | DIASTOLIC BLOOD PRESSURE: 76 MMHG | SYSTOLIC BLOOD PRESSURE: 108 MMHG

## 2021-12-07 DIAGNOSIS — Z01.419 ENCOUNTER FOR ANNUAL ROUTINE GYNECOLOGICAL EXAMINATION: Primary | ICD-10-CM

## 2021-12-07 DIAGNOSIS — N92.0 MENORRHAGIA WITH REGULAR CYCLE: ICD-10-CM

## 2021-12-07 DIAGNOSIS — R68.82 DECREASED LIBIDO: ICD-10-CM

## 2021-12-07 PROBLEM — G43.909 MIGRAINE HEADACHE: Status: ACTIVE | Noted: 2021-12-07

## 2021-12-07 PROBLEM — F41.9 ANXIETY: Status: ACTIVE | Noted: 2021-12-07

## 2021-12-07 PROCEDURE — 99385 PREV VISIT NEW AGE 18-39: CPT | Performed by: NURSE PRACTITIONER

## 2021-12-07 PROCEDURE — 99213 OFFICE O/P EST LOW 20 MIN: CPT | Performed by: NURSE PRACTITIONER

## 2021-12-07 PROCEDURE — G0123 SCREEN CERV/VAG THIN LAYER: HCPCS | Performed by: NURSE PRACTITIONER

## 2021-12-07 RX ORDER — CEPHALEXIN 500 MG/1
500 CAPSULE ORAL 3 TIMES DAILY
COMMUNITY
Start: 2021-10-13 | End: 2022-02-01

## 2021-12-07 RX ORDER — NORGESTIMATE AND ETHINYL ESTRADIOL 7DAYSX3 LO
1 KIT ORAL DAILY
Qty: 84 TABLET | Refills: 4 | Status: SHIPPED | OUTPATIENT
Start: 2021-12-07 | End: 2022-07-26

## 2021-12-07 RX ORDER — FOLIC ACID 1 MG/1
1 TABLET ORAL DAILY
Qty: 90 TABLET | Refills: 3 | Status: SHIPPED | OUTPATIENT
Start: 2021-12-07 | End: 2022-02-01

## 2021-12-07 NOTE — PROGRESS NOTES
GYN Annual Exam     CC - Here for annual exam. Low red blood count, bleeding is heavier, low sex drive.        HPI  Marie Stout is a 41 y.o. female, , who presents for annual well woman exam. Patient's last menstrual period was 2021 (approximate)..  Periods are regular every 25-35 days, lasting 3-5 days. .  Dysmenorrhea:none.  Patient reports problems with: heavy bleeding. The patient uses 1 of tampons/pads per hour..  There were no changes to her medical or surgical history since her last visit.. Partner Status: Marital Status: .  She is sexually active. She has not had new partners since her last STD testing. She does not desire STD testing. .    Additional OB/GYN History   Current contraception: contraceptive methods: Vasectomy   Desires to: continue contraception  Last Pap :   Last Completed Pap Smear     This patient has no relevant Health Maintenance data.        History of abnormal Pap smear: no  Family history of uterine, colon, breast, or ovarian cancer: yes - cervical, mother  Exercises Regularly:yes  Feelings of Anxiety or Depression: controlled  Tobacco Usage?: No   OB History        2    Para   2    Term   2            AB        Living   2       SAB        IAB        Ectopic        Molar        Multiple        Live Births   2                Health Maintenance   Topic Date Due   • Annual Gynecologic Pelvic and Breast Exam  Never done   • ANNUAL PHYSICAL  Never done   • HEPATITIS C SCREENING  Never done   • PAP SMEAR  Never done   • TDAP/TD VACCINES (2 - Td or Tdap) 2030   • COVID-19 Vaccine  Completed   • INFLUENZA VACCINE  Completed   • Pneumococcal Vaccine 0-64  Aged Out       The additional following portions of the patient's history were reviewed and updated as appropriate: allergies, current medications, past family history, past medical history, past social history, past surgical history and problem list.    Review of Systems   Constitutional: Negative.   "  HENT: Negative.    Eyes: Negative.    Respiratory: Negative for cough and shortness of breath.    Cardiovascular: Negative for chest pain and leg swelling.   Gastrointestinal: Negative for abdominal pain.   Endocrine: Negative.    Genitourinary: Positive for decreased libido and menstrual problem. Negative for breast discharge, breast lump, breast pain, difficulty urinating, dyspareunia, dysuria, pelvic pain and vaginal discharge.   Musculoskeletal: Negative.    Skin: Negative.    Allergic/Immunologic:        No new allergies.   Neurological: Negative.    Hematological: Negative.    Psychiatric/Behavioral: Negative for depressed mood. The patient is not nervous/anxious.          I have reviewed and agree with the HPI, ROS, and historical information as entered above. Cabrera Payne, APRN    Objective   /76 (BP Location: Right arm, Patient Position: Sitting, Cuff Size: Small Adult)   Pulse 90   Ht 157.5 cm (62.01\")   Wt 50.8 kg (112 lb)   LMP 11/29/2021 (Approximate)   Breastfeeding No   BMI 20.48 kg/m²     Physical Exam  Vitals and nursing note reviewed. Exam conducted with a chaperone present.   Constitutional:       Appearance: Normal appearance. She is well-developed and well-groomed.   HENT:      Head: Normocephalic.   Eyes:      Pupils: Pupils are equal, round, and reactive to light.   Neck:      Thyroid: No thyroid mass or thyromegaly.   Cardiovascular:      Rate and Rhythm: Normal rate and regular rhythm.      Heart sounds: Normal heart sounds.   Pulmonary:      Effort: Pulmonary effort is normal.      Breath sounds: Normal breath sounds.   Chest:   Breasts:      Right: Normal. No inverted nipple, mass, nipple discharge or skin change.      Left: Normal. No inverted nipple, mass, nipple discharge or skin change.       Abdominal:      General: Abdomen is flat. Bowel sounds are normal.      Palpations: Abdomen is soft.      Tenderness: There is no abdominal tenderness.   Genitourinary:     " General: Normal vulva.      Exam position: Lithotomy position.      Pubic Area: No rash or pubic lice.       Vagina: Normal.      Cervix: No cervical motion tenderness.      Uterus: Normal.       Adnexa: Right adnexa normal and left adnexa normal.        Right: No mass, tenderness or fullness.          Left: No mass, tenderness or fullness.        Rectum: Normal.   Musculoskeletal:      Cervical back: Normal range of motion and neck supple.   Skin:     General: Skin is warm and dry.   Neurological:      General: No focal deficit present.      Mental Status: She is alert.   Psychiatric:         Attention and Perception: Attention and perception normal.         Mood and Affect: Mood and affect normal.         Speech: Speech normal.         Behavior: Behavior normal. Behavior is cooperative.         Cognition and Memory: Cognition normal.         Judgment: Judgment normal.            Assessment and Plan    Problem List Items Addressed This Visit     None      Visit Diagnoses     Encounter for annual routine gynecological examination    -  Primary    Relevant Orders    IGP,rfx Aptima HPV All Pth    Menorrhagia with regular cycle        Relevant Medications    norgestimate-ethinyl estradiol (Ortho Tri-Cyclen Lo) 0.18/0.215/0.25 MG-25 MCG per tablet    Decreased libido        Relevant Medications    folic acid (FOLVITE) 1 MG tablet          1. GYN annual well woman exam.   2. Would like to try OCPs for cycle control. Discussed folic acid for decreased sex drive. Written information on addyi given   3. Reviewed monthly self breast exams.  Instructed to call with lumps, pain, or breast discharge.    4. Reviewed exercise as a preventative health measures.   5. Reccommended Flu Vaccine in Fall of each year.  6. RTC in 1 year or PRN with problems      Cabrera Payne, APRN  12/07/2021

## 2021-12-10 LAB
CONV .: NORMAL
CYTOLOGIST CVX/VAG CYTO: NORMAL
CYTOLOGY CVX/VAG DOC CYTO: NORMAL
CYTOLOGY CVX/VAG DOC THIN PREP: NORMAL
DX ICD CODE: NORMAL
HIV 1 & 2 AB SER-IMP: NORMAL
OTHER STN SPEC: NORMAL
STAT OF ADQ CVX/VAG CYTO-IMP: NORMAL

## 2021-12-10 NOTE — PROGRESS NOTES
Pt aware of results no symptoms that she is aware of but wanted you to send any ways just incase she starts having symptoms.

## 2021-12-13 DIAGNOSIS — B37.9 CANDIDIASIS: Primary | ICD-10-CM

## 2021-12-13 RX ORDER — FLUCONAZOLE 150 MG/1
150 TABLET ORAL ONCE
Qty: 1 TABLET | Refills: 0 | Status: SHIPPED | OUTPATIENT
Start: 2021-12-13 | End: 2021-12-13

## 2022-01-09 PROCEDURE — U0004 COV-19 TEST NON-CDC HGH THRU: HCPCS | Performed by: FAMILY MEDICINE

## 2022-01-10 ENCOUNTER — TELEPHONE (OUTPATIENT)
Dept: URGENT CARE | Facility: CLINIC | Age: 42
End: 2022-01-10

## 2022-02-01 ENCOUNTER — OFFICE VISIT (OUTPATIENT)
Dept: FAMILY MEDICINE CLINIC | Facility: CLINIC | Age: 42
End: 2022-02-01

## 2022-02-01 VITALS
OXYGEN SATURATION: 100 % | RESPIRATION RATE: 12 BRPM | WEIGHT: 109.8 LBS | TEMPERATURE: 97.2 F | HEIGHT: 62 IN | SYSTOLIC BLOOD PRESSURE: 119 MMHG | BODY MASS INDEX: 20.2 KG/M2 | DIASTOLIC BLOOD PRESSURE: 79 MMHG | HEART RATE: 80 BPM

## 2022-02-01 DIAGNOSIS — F41.1 GENERALIZED ANXIETY DISORDER: ICD-10-CM

## 2022-02-01 PROCEDURE — 99213 OFFICE O/P EST LOW 20 MIN: CPT | Performed by: NURSE PRACTITIONER

## 2022-02-01 RX ORDER — BUPROPION HYDROCHLORIDE 100 MG/1
100 TABLET ORAL 3 TIMES DAILY
Qty: 270 TABLET | Refills: 1 | Status: SHIPPED | OUTPATIENT
Start: 2022-02-01 | End: 2022-07-26 | Stop reason: SDUPTHER

## 2022-02-01 NOTE — PROGRESS NOTES
"Chief Complaint  Depression and Med Refill    Subjective          Marie Stout presents to [facility]  History of Present Illness  She is doing well with wellbutrin.  No SI/HI.  She is working and it is rough at times.  She feels good overall.  She is back on birth control.  She said her cycles are still heavy but she has not been on that long.        Allergies  Patient has no known allergies.    Social History     Tobacco Use   • Smoking status: Never Smoker   • Smokeless tobacco: Never Used   Vaping Use   • Vaping Use: Never used   Substance Use Topics   • Alcohol use: Never   • Drug use: Never       Family History   Problem Relation Age of Onset   • Stroke Mother    • Hypertension Mother    • Hyperlipidemia Mother    • Diabetes Mother    • Diabetes type II Mother    • Cervical cancer Mother    • Diabetes Father    • Diabetes type II Father         Health Maintenance Due   Topic Date Due   • ANNUAL PHYSICAL  Never done   • HEPATITIS C SCREENING  Never done   • COVID-19 Vaccine (3 - Booster for Moderna series) 11/27/2021        Immunization History   Administered Date(s) Administered   • COVID-19 (MODERNA) 1st, 2nd, 3rd Dose Only 05/30/2021, 06/27/2021   • FluLaval/Fluarix/Fluzone >6 10/12/2021   • Influenza, Unspecified 10/01/2018, 10/01/2019, 10/01/2020   • Tdap 01/23/2020       Review of Systems   Constitutional: Negative for fatigue.   Respiratory: Negative for cough and shortness of breath.    Cardiovascular: Negative for chest pain and leg swelling.   Gastrointestinal: Negative for constipation, diarrhea, nausea and vomiting.   Skin: Negative for rash.   Psychiatric/Behavioral: Negative for hallucinations and suicidal ideas.        Objective       Vitals:    02/01/22 0804   BP: 119/79   Pulse: 80   Resp: 12   Temp: 97.2 °F (36.2 °C)   SpO2: 100%   Weight: 49.8 kg (109 lb 12.8 oz)   Height: 157.5 cm (62\")       Body mass index is 20.08 kg/m².         Physical Exam  Vitals " reviewed.   Constitutional:       Appearance: Normal appearance. She is well-developed.   Cardiovascular:      Rate and Rhythm: Normal rate and regular rhythm.      Heart sounds: Normal heart sounds. No murmur heard.      Pulmonary:      Effort: Pulmonary effort is normal.      Breath sounds: Normal breath sounds.   Neurological:      Mental Status: She is alert and oriented to person, place, and time.      Cranial Nerves: No cranial nerve deficit.      Motor: No weakness.   Psychiatric:         Mood and Affect: Mood and affect normal.             Result Review :     The following data was reviewed by: SHEYLA Rocha on 02/01/2022:    Common labs    Common Labsle 11/9/21 11/9/21    1631 1631   Glucose  87   BUN  12   Creatinine  0.75   eGFR Non African Am  86   Sodium  138   Potassium  3.9   Chloride  106   Calcium  8.7   Albumin  4.40   Total Bilirubin  0.3   Alkaline Phosphatase  40   AST (SGOT)  20   ALT (SGPT)  13   WBC 4.88    Hemoglobin 10.3 (A)    Hematocrit 30.3 (A)    Platelets 230    (A) Abnormal value                           Assessment and Plan      Diagnoses and all orders for this visit:    1. Generalized anxiety disorder  -     buPROPion (WELLBUTRIN) 100 MG tablet; Take 1 tablet by mouth 3 (Three) Times a Day.  Dispense: 270 tablet; Refill: 1            Follow Up     Return in about 6 months (around 8/1/2022).  Follow-up in 6 months for labs and appt. Call with any concerns or questions that you may have regarding your medications or history.    I have reviewed all medications and at this time no medications changes need to be adjusted for all chronic conditions.    Patient was given instructions and counseling regarding her condition or for health maintenance advice. Please see specific information pulled into the AVS if appropriate.         SHEYLA Rocha  02/01/2022

## 2022-07-26 ENCOUNTER — OFFICE VISIT (OUTPATIENT)
Dept: FAMILY MEDICINE CLINIC | Facility: CLINIC | Age: 42
End: 2022-07-26

## 2022-07-26 VITALS
HEART RATE: 85 BPM | WEIGHT: 110.9 LBS | TEMPERATURE: 96.8 F | OXYGEN SATURATION: 98 % | BODY MASS INDEX: 20.41 KG/M2 | RESPIRATION RATE: 20 BRPM | SYSTOLIC BLOOD PRESSURE: 110 MMHG | DIASTOLIC BLOOD PRESSURE: 64 MMHG | HEIGHT: 62 IN

## 2022-07-26 DIAGNOSIS — N93.9 ABNORMAL BLEEDING IN MENSTRUAL CYCLE: ICD-10-CM

## 2022-07-26 DIAGNOSIS — Z86.39 HISTORY OF VITAMIN D DEFICIENCY: ICD-10-CM

## 2022-07-26 DIAGNOSIS — F41.1 GENERALIZED ANXIETY DISORDER: Primary | ICD-10-CM

## 2022-07-26 DIAGNOSIS — Z11.59 ENCOUNTER FOR HEPATITIS C SCREENING TEST FOR LOW RISK PATIENT: ICD-10-CM

## 2022-07-26 DIAGNOSIS — Z13.220 SCREENING CHOLESTEROL LEVEL: ICD-10-CM

## 2022-07-26 LAB
25(OH)D3 SERPL-MCNC: 50.6 NG/ML (ref 30–100)
ALBUMIN SERPL-MCNC: 4.5 G/DL (ref 3.5–5.2)
ALBUMIN/GLOB SERPL: 1.7 G/DL
ALP SERPL-CCNC: 38 U/L (ref 39–117)
ALT SERPL W P-5'-P-CCNC: 11 U/L (ref 1–33)
ANION GAP SERPL CALCULATED.3IONS-SCNC: 9.6 MMOL/L (ref 5–15)
AST SERPL-CCNC: 18 U/L (ref 1–32)
BASOPHILS # BLD AUTO: 0.03 10*3/MM3 (ref 0–0.2)
BASOPHILS NFR BLD AUTO: 0.8 % (ref 0–1.5)
BILIRUB SERPL-MCNC: <0.2 MG/DL (ref 0–1.2)
BUN SERPL-MCNC: 9 MG/DL (ref 6–20)
BUN/CREAT SERPL: 11.5 (ref 7–25)
CALCIUM SPEC-SCNC: 8.5 MG/DL (ref 8.6–10.5)
CHLORIDE SERPL-SCNC: 109 MMOL/L (ref 98–107)
CHOLEST SERPL-MCNC: 198 MG/DL (ref 0–200)
CO2 SERPL-SCNC: 20.4 MMOL/L (ref 22–29)
CREAT SERPL-MCNC: 0.78 MG/DL (ref 0.57–1)
DEPRECATED RDW RBC AUTO: 44.6 FL (ref 37–54)
EGFRCR SERPLBLD CKD-EPI 2021: 98.6 ML/MIN/1.73
EOSINOPHIL # BLD AUTO: 0.03 10*3/MM3 (ref 0–0.4)
EOSINOPHIL NFR BLD AUTO: 0.8 % (ref 0.3–6.2)
ERYTHROCYTE [DISTWIDTH] IN BLOOD BY AUTOMATED COUNT: 13.4 % (ref 12.3–15.4)
FSH SERPL-ACNC: 3.29 MIU/ML
GLOBULIN UR ELPH-MCNC: 2.6 GM/DL
GLUCOSE SERPL-MCNC: 89 MG/DL (ref 65–99)
HCT VFR BLD AUTO: 33.8 % (ref 34–46.6)
HCV AB SER DONR QL: NORMAL
HDLC SERPL-MCNC: 74 MG/DL (ref 40–60)
HGB BLD-MCNC: 11 G/DL (ref 12–15.9)
LDLC SERPL CALC-MCNC: 115 MG/DL (ref 0–100)
LDLC/HDLC SERPL: 1.55 {RATIO}
LH SERPL-ACNC: 8.77 MIU/ML
LYMPHOCYTES # BLD AUTO: 1.64 10*3/MM3 (ref 0.7–3.1)
LYMPHOCYTES NFR BLD AUTO: 41.6 % (ref 19.6–45.3)
MCH RBC QN AUTO: 29.7 PG (ref 26.6–33)
MCHC RBC AUTO-ENTMCNC: 32.5 G/DL (ref 31.5–35.7)
MCV RBC AUTO: 91.4 FL (ref 79–97)
MONOCYTES # BLD AUTO: 0.33 10*3/MM3 (ref 0.1–0.9)
MONOCYTES NFR BLD AUTO: 8.4 % (ref 5–12)
NEUTROPHILS NFR BLD AUTO: 1.9 10*3/MM3 (ref 1.7–7)
NEUTROPHILS NFR BLD AUTO: 48.1 % (ref 42.7–76)
PLATELET # BLD AUTO: 195 10*3/MM3 (ref 140–450)
PMV BLD AUTO: 12.6 FL (ref 6–12)
POTASSIUM SERPL-SCNC: 4 MMOL/L (ref 3.5–5.2)
PROGEST SERPL-MCNC: 10.9 NG/ML
PROT SERPL-MCNC: 7.1 G/DL (ref 6–8.5)
RBC # BLD AUTO: 3.7 10*6/MM3 (ref 3.77–5.28)
SODIUM SERPL-SCNC: 139 MMOL/L (ref 136–145)
TRIGL SERPL-MCNC: 45 MG/DL (ref 0–150)
TSH SERPL DL<=0.05 MIU/L-ACNC: 2.79 UIU/ML (ref 0.27–4.2)
VLDLC SERPL-MCNC: 9 MG/DL (ref 5–40)
WBC NRBC COR # BLD: 3.94 10*3/MM3 (ref 3.4–10.8)

## 2022-07-26 PROCEDURE — 82672 ASSAY OF ESTROGEN: CPT | Performed by: NURSE PRACTITIONER

## 2022-07-26 PROCEDURE — 82306 VITAMIN D 25 HYDROXY: CPT | Performed by: NURSE PRACTITIONER

## 2022-07-26 PROCEDURE — 83001 ASSAY OF GONADOTROPIN (FSH): CPT | Performed by: NURSE PRACTITIONER

## 2022-07-26 PROCEDURE — 99214 OFFICE O/P EST MOD 30 MIN: CPT | Performed by: NURSE PRACTITIONER

## 2022-07-26 PROCEDURE — 84144 ASSAY OF PROGESTERONE: CPT | Performed by: NURSE PRACTITIONER

## 2022-07-26 PROCEDURE — 83002 ASSAY OF GONADOTROPIN (LH): CPT | Performed by: NURSE PRACTITIONER

## 2022-07-26 PROCEDURE — 86803 HEPATITIS C AB TEST: CPT | Performed by: NURSE PRACTITIONER

## 2022-07-26 PROCEDURE — 80061 LIPID PANEL: CPT | Performed by: NURSE PRACTITIONER

## 2022-07-26 PROCEDURE — 80050 GENERAL HEALTH PANEL: CPT | Performed by: NURSE PRACTITIONER

## 2022-07-26 RX ORDER — BUPROPION HYDROCHLORIDE 100 MG/1
100 TABLET ORAL 4 TIMES DAILY
Qty: 360 TABLET | Refills: 1 | Status: SHIPPED | OUTPATIENT
Start: 2022-07-26 | End: 2023-01-17 | Stop reason: SDUPTHER

## 2022-07-26 NOTE — PROGRESS NOTES
Answers for HPI/ROS submitted by the patient on 7/26/2022  Please describe your symptoms.: Rx refill  Have you had these symptoms before?: No  How long have you been having these symptoms?: Greater than 2 weeks  What is the primary reason for your visit?: Other    Chief Complaint  Depression and Migraine    Subjective          Marie Stout presents to [facility]  History of Present Illness  She is here for refill of her Wellbutrin.  She is doing well without medication.  It controls the anxiety for the most part but she has been feeling increased stress with work with COVID numbers going up.  She would like to increase the Wellbutrin if possible.  No suicidal thoughts or hallucinations noted.  She did have her Pap smear done recently.  She still has heavy cycles just for 2 days and then she will have 2 light days which her cycles over in 4 days.  She would like her hormones checked if possible.  She is not currently on birth control.  Her last cycle was July 6  She continues to see neurology in [place] for her migraine headaches.    Depression: Not at risk   • PHQ-2 Score: 0           Allergies  Patient has no known allergies.    Social History     Tobacco Use   • Smoking status: Never Smoker   • Smokeless tobacco: Never Used   Vaping Use   • Vaping Use: Never used   Substance Use Topics   • Alcohol use: Never   • Drug use: Never       Family History   Problem Relation Age of Onset   • Stroke Mother    • Hypertension Mother    • Hyperlipidemia Mother    • Diabetes Mother    • Diabetes type II Mother    • Cervical cancer Mother    • Diabetes Father    • Diabetes type II Father         Health Maintenance Due   Topic Date Due   • ANNUAL PHYSICAL  Never done   • HEPATITIS C SCREENING  Never done        Immunization History   Administered Date(s) Administered   • COVID-19 (MODERNA) 1st, 2nd, 3rd Dose Only 05/30/2021, 06/27/2021   • FluLaval/Fluarix/Fluzone >6 10/12/2021   • Influenza,  "Unspecified 10/01/2018, 10/01/2019, 10/01/2020   • Tdap 01/23/2020       Review of Systems   Constitutional: Negative for fatigue.   Respiratory: Negative for cough and shortness of breath.    Cardiovascular: Negative for chest pain.   Gastrointestinal: Negative for diarrhea, nausea and vomiting.   Psychiatric/Behavioral: Negative for suicidal ideas and depressed mood.        Objective       Vitals:    07/26/22 0800   BP: 110/64   Pulse: 85   Resp: 20   Temp: 96.8 °F (36 °C)   SpO2: 98%   Weight: 50.3 kg (110 lb 14.4 oz)   Height: 157.5 cm (62\")       Body mass index is 20.28 kg/m².         Physical Exam  Vitals reviewed.   Constitutional:       Appearance: Normal appearance. She is well-developed.   Cardiovascular:      Rate and Rhythm: Normal rate and regular rhythm.      Heart sounds: Normal heart sounds. No murmur heard.  Pulmonary:      Effort: Pulmonary effort is normal.      Breath sounds: Normal breath sounds.   Neurological:      Mental Status: She is alert and oriented to person, place, and time.      Cranial Nerves: No cranial nerve deficit.      Motor: No weakness.   Psychiatric:         Mood and Affect: Mood and affect normal.             Result Review :     The following data was reviewed by: SHEYLA Rocha on 07/26/2022:    Common labs    Common Labsle 11/9/21 11/9/21    1631 1631   Glucose  87   BUN  12   Creatinine  0.75   eGFR Non African Am  86   Sodium  138   Potassium  3.9   Chloride  106   Calcium  8.7   Albumin  4.40   Total Bilirubin  0.3   Alkaline Phosphatase  40   AST (SGOT)  20   ALT (SGPT)  13   WBC 4.88    Hemoglobin 10.3 (A)    Hematocrit 30.3 (A)    Platelets 230    (A) Abnormal value                           Assessment and Plan      Diagnoses and all orders for this visit:    1. Generalized anxiety disorder (Primary)  -     Comprehensive Metabolic Panel  -     CBC & Differential  -     TSH  -     buPROPion (WELLBUTRIN) 100 MG tablet; Take 1 tablet by mouth 4 (Four) Times " a Day.  Dispense: 360 tablet; Refill: 1    2. History of vitamin D deficiency  -     Vitamin D 25 Hydroxy    3. Screening cholesterol level  -     Lipid Panel    4. Encounter for hepatitis C screening test for low risk patient  -     Hepatitis C antibody    5. Abnormal bleeding in menstrual cycle  -     Estrogens, Total  -     Follicle Stimulating Hormone  -     Luteinizing Hormone  -     Progesterone            Follow Up     Return in about 6 months (around 1/26/2023).  Follow-up in 6 months for labs and appt. Call with any concerns or questions that you may have regarding your medications or history.    I have adjusted the Wellbutrin and keep me posted.  We will check hormone level today and also blood work.  She does eat fast food quite a bit.  If needed we may adjust the Wellbutrin to 150 3 times a day versus the 200 twice a day.  Patient was given instructions and counseling regarding her condition or for health maintenance advice. Please see specific information pulled into the AVS if appropriate.         Jackie Hinds, APRN  07/26/2022

## 2022-07-29 LAB — ESTROGEN SERPL-MCNC: 646 PG/ML

## 2023-01-17 ENCOUNTER — OFFICE VISIT (OUTPATIENT)
Dept: FAMILY MEDICINE CLINIC | Facility: CLINIC | Age: 43
End: 2023-01-17
Payer: COMMERCIAL

## 2023-01-17 VITALS
DIASTOLIC BLOOD PRESSURE: 68 MMHG | HEIGHT: 62 IN | OXYGEN SATURATION: 100 % | RESPIRATION RATE: 16 BRPM | BODY MASS INDEX: 20.24 KG/M2 | HEART RATE: 88 BPM | TEMPERATURE: 97.7 F | WEIGHT: 110 LBS | SYSTOLIC BLOOD PRESSURE: 110 MMHG

## 2023-01-17 DIAGNOSIS — D64.9 LOW HEMOGLOBIN: ICD-10-CM

## 2023-01-17 DIAGNOSIS — R56.9 SEIZURE: ICD-10-CM

## 2023-01-17 DIAGNOSIS — F41.1 GENERALIZED ANXIETY DISORDER: Primary | ICD-10-CM

## 2023-01-17 LAB
ALBUMIN SERPL-MCNC: 4.5 G/DL (ref 3.5–5.2)
ALBUMIN/GLOB SERPL: 1.7 G/DL
ALP SERPL-CCNC: 37 U/L (ref 39–117)
ALT SERPL W P-5'-P-CCNC: 9 U/L (ref 1–33)
ANION GAP SERPL CALCULATED.3IONS-SCNC: 9.7 MMOL/L (ref 5–15)
AST SERPL-CCNC: 18 U/L (ref 1–32)
BASOPHILS # BLD AUTO: 0.03 10*3/MM3 (ref 0–0.2)
BASOPHILS NFR BLD AUTO: 0.8 % (ref 0–1.5)
BILIRUB SERPL-MCNC: 0.2 MG/DL (ref 0–1.2)
BUN SERPL-MCNC: 10 MG/DL (ref 6–20)
BUN/CREAT SERPL: 10.3 (ref 7–25)
CALCIUM SPEC-SCNC: 8.8 MG/DL (ref 8.6–10.5)
CHLORIDE SERPL-SCNC: 108 MMOL/L (ref 98–107)
CO2 SERPL-SCNC: 23.3 MMOL/L (ref 22–29)
CREAT SERPL-MCNC: 0.97 MG/DL (ref 0.57–1)
DEPRECATED RDW RBC AUTO: 46.9 FL (ref 37–54)
EGFRCR SERPLBLD CKD-EPI 2021: 75.4 ML/MIN/1.73
EOSINOPHIL # BLD AUTO: 0.03 10*3/MM3 (ref 0–0.4)
EOSINOPHIL NFR BLD AUTO: 0.8 % (ref 0.3–6.2)
ERYTHROCYTE [DISTWIDTH] IN BLOOD BY AUTOMATED COUNT: 14 % (ref 12.3–15.4)
GLOBULIN UR ELPH-MCNC: 2.6 GM/DL
GLUCOSE SERPL-MCNC: 98 MG/DL (ref 65–99)
HCT VFR BLD AUTO: 33.9 % (ref 34–46.6)
HGB BLD-MCNC: 10.7 G/DL (ref 12–15.9)
IMM GRANULOCYTES # BLD AUTO: 0.01 10*3/MM3 (ref 0–0.05)
IMM GRANULOCYTES NFR BLD AUTO: 0.3 % (ref 0–0.5)
LYMPHOCYTES # BLD AUTO: 1.46 10*3/MM3 (ref 0.7–3.1)
LYMPHOCYTES NFR BLD AUTO: 38.5 % (ref 19.6–45.3)
MCH RBC QN AUTO: 28.7 PG (ref 26.6–33)
MCHC RBC AUTO-ENTMCNC: 31.6 G/DL (ref 31.5–35.7)
MCV RBC AUTO: 90.9 FL (ref 79–97)
MONOCYTES # BLD AUTO: 0.39 10*3/MM3 (ref 0.1–0.9)
MONOCYTES NFR BLD AUTO: 10.3 % (ref 5–12)
NEUTROPHILS NFR BLD AUTO: 1.87 10*3/MM3 (ref 1.7–7)
NEUTROPHILS NFR BLD AUTO: 49.3 % (ref 42.7–76)
NRBC BLD AUTO-RTO: 0 /100 WBC (ref 0–0.2)
PLATELET # BLD AUTO: 237 10*3/MM3 (ref 140–450)
PMV BLD AUTO: 12.6 FL (ref 6–12)
POTASSIUM SERPL-SCNC: 3.7 MMOL/L (ref 3.5–5.2)
PROT SERPL-MCNC: 7.1 G/DL (ref 6–8.5)
RBC # BLD AUTO: 3.73 10*6/MM3 (ref 3.77–5.28)
SODIUM SERPL-SCNC: 141 MMOL/L (ref 136–145)
WBC NRBC COR # BLD: 3.79 10*3/MM3 (ref 3.4–10.8)

## 2023-01-17 PROCEDURE — 83540 ASSAY OF IRON: CPT | Performed by: NURSE PRACTITIONER

## 2023-01-17 PROCEDURE — 85025 COMPLETE CBC W/AUTO DIFF WBC: CPT | Performed by: NURSE PRACTITIONER

## 2023-01-17 PROCEDURE — 36415 COLL VENOUS BLD VENIPUNCTURE: CPT | Performed by: NURSE PRACTITIONER

## 2023-01-17 PROCEDURE — 99213 OFFICE O/P EST LOW 20 MIN: CPT | Performed by: NURSE PRACTITIONER

## 2023-01-17 PROCEDURE — 80053 COMPREHEN METABOLIC PANEL: CPT | Performed by: NURSE PRACTITIONER

## 2023-01-17 PROCEDURE — 84466 ASSAY OF TRANSFERRIN: CPT | Performed by: NURSE PRACTITIONER

## 2023-01-17 RX ORDER — BUPROPION HYDROCHLORIDE 100 MG/1
100 TABLET ORAL 4 TIMES DAILY
Qty: 360 TABLET | Refills: 1 | Status: SHIPPED | OUTPATIENT
Start: 2023-01-17

## 2023-01-17 RX ORDER — TOPIRAMATE 100 MG/1
100 TABLET, FILM COATED ORAL 2 TIMES DAILY
Qty: 60 TABLET | Refills: 0 | Status: SHIPPED | OUTPATIENT
Start: 2023-01-17 | End: 2023-01-18 | Stop reason: SDUPTHER

## 2023-01-17 NOTE — PROGRESS NOTES
"Chief Complaint  Depression and Anxiety    Subjective          Marie Stout presents to [facility]  History of Present Illness  LUIZ: She is doing well with the wellbutrin.  No suicidal thoughts hallucinations.  Epileptic sz:  She has not had a sz for over a 1 1/2.  She has been reaching out to neuro in [facility] and no one will return the call.  She also takes her Keppra daily but does not need a refill that currently.  She is also on Klonopin from neurology too.      Allergies  Patient has no known allergies.    Social History     Tobacco Use   • Smoking status: Never   • Smokeless tobacco: Never   Vaping Use   • Vaping Use: Never used   Substance Use Topics   • Alcohol use: Never   • Drug use: Never       Family History   Problem Relation Age of Onset   • Stroke Mother    • Hypertension Mother    • Hyperlipidemia Mother    • Diabetes Mother    • Diabetes type II Mother    • Cervical cancer Mother    • Diabetes Father    • Diabetes type II Father         Health Maintenance Due   Topic Date Due   • ANNUAL PHYSICAL  Never done        Immunization History   Administered Date(s) Administered   • COVID-19 (MODERNA) 1st, 2nd, 3rd Dose Only 05/30/2021, 06/27/2021   • FluLaval/Fluzone >6mos 10/12/2021, 09/29/2022   • Influenza, Unspecified 10/01/2018, 10/01/2019, 10/01/2020   • Tdap 01/23/2020       Review of Systems   Constitutional: Negative for fatigue.   Respiratory: Negative for cough and shortness of breath.    Cardiovascular: Negative for chest pain.   Gastrointestinal: Negative for diarrhea, nausea and vomiting.        Objective       Vitals:    01/17/23 0809   BP: 110/68   Pulse: 88   Resp: 16   Temp: 97.7 °F (36.5 °C)   SpO2: 100%   Weight: 49.9 kg (110 lb)   Height: 157.5 cm (62\")       Body mass index is 20.12 kg/m².         Physical Exam  Vitals reviewed.   Constitutional:       Appearance: Normal appearance. She is well-developed.   Cardiovascular:      Rate and Rhythm: Normal " rate and regular rhythm.      Heart sounds: Normal heart sounds. No murmur heard.  Pulmonary:      Effort: Pulmonary effort is normal.      Breath sounds: Normal breath sounds.   Neurological:      Mental Status: She is alert and oriented to person, place, and time.      Cranial Nerves: No cranial nerve deficit.      Motor: No weakness.   Psychiatric:         Mood and Affect: Mood and affect normal.             Result Review :     The following data was reviewed by: SHEYLA Rocha on 01/17/2023:    Common labs    Common Labs 7/26/22 7/26/22 7/26/22    0816 0816 0816   Glucose  89    BUN  9    Creatinine  0.78    Sodium  139    Potassium  4.0    Chloride  109 (A)    Calcium  8.5 (A)    Albumin  4.50    Total Bilirubin  <0.2    Alkaline Phosphatase  38 (A)    AST (SGOT)  18    ALT (SGPT)  11    WBC 3.94     Hemoglobin 11.0 (A)     Hematocrit 33.8 (A)     Platelets 195     Total Cholesterol   198   Triglycerides   45   HDL Cholesterol   74 (A)   LDL Cholesterol    115 (A)   (A) Abnormal value                           Assessment and Plan      Diagnoses and all orders for this visit:    1. Generalized anxiety disorder (Primary)  -     buPROPion (WELLBUTRIN) 100 MG tablet; Take 1 tablet by mouth 4 (Four) Times a Day.  Dispense: 360 tablet; Refill: 1  -     CBC Auto Differential  -     Comprehensive Metabolic Panel    2. Seizure (HCC)  -     topiramate (TOPAMAX) 100 MG tablet; Take 1 tablet by mouth 2 (two) times a day.  Dispense: 60 tablet; Refill: 0  -     CBC Auto Differential  -     Comprehensive Metabolic Panel            Follow Up     Return in about 6 months (around 7/17/2023).  Follow-up in 6 months for labs and appt. Call with any concerns or questions that you may have regarding your medications or history.  I did do a courtesy refill as she is in transition with neurology.  Keep me posted if they do not refill within 3 weeks.  We will do labs in 6 mths--fasting.    I have reviewed all medications  and at this time no medications changes need to be adjusted for all chronic conditions.    Patient was given instructions and counseling regarding her condition or for health maintenance advice. Please see specific information pulled into the AVS if appropriate.         Jackie Hinds, APRN  01/17/2023

## 2023-01-18 LAB
IRON 24H UR-MRATE: 64 MCG/DL (ref 37–145)
IRON SATN MFR SERPL: 14 % (ref 20–50)
TIBC SERPL-MCNC: 460 MCG/DL (ref 298–536)
TRANSFERRIN SERPL-MCNC: 309 MG/DL (ref 200–360)

## 2023-07-24 DIAGNOSIS — F41.1 GENERALIZED ANXIETY DISORDER: ICD-10-CM

## 2023-07-24 RX ORDER — BUPROPION HYDROCHLORIDE 100 MG/1
100 TABLET ORAL 4 TIMES DAILY
Qty: 120 TABLET | Refills: 0 | Status: SHIPPED | OUTPATIENT
Start: 2023-07-24

## 2023-08-19 DIAGNOSIS — F41.1 GENERALIZED ANXIETY DISORDER: ICD-10-CM

## 2023-08-21 RX ORDER — BUPROPION HYDROCHLORIDE 100 MG/1
100 TABLET ORAL 4 TIMES DAILY
Qty: 120 TABLET | Refills: 0 | Status: SHIPPED | OUTPATIENT
Start: 2023-08-21 | End: 2023-08-28 | Stop reason: SDUPTHER

## 2023-08-28 ENCOUNTER — OFFICE VISIT (OUTPATIENT)
Dept: FAMILY MEDICINE CLINIC | Facility: CLINIC | Age: 43
End: 2023-08-28
Payer: COMMERCIAL

## 2023-08-28 VITALS
BODY MASS INDEX: 20.43 KG/M2 | TEMPERATURE: 98.2 F | SYSTOLIC BLOOD PRESSURE: 110 MMHG | HEART RATE: 93 BPM | WEIGHT: 111 LBS | DIASTOLIC BLOOD PRESSURE: 73 MMHG | OXYGEN SATURATION: 100 % | HEIGHT: 62 IN | RESPIRATION RATE: 16 BRPM

## 2023-08-28 DIAGNOSIS — F41.1 GENERALIZED ANXIETY DISORDER: Primary | ICD-10-CM

## 2023-08-28 DIAGNOSIS — F33.1 MODERATE EPISODE OF RECURRENT MAJOR DEPRESSIVE DISORDER: ICD-10-CM

## 2023-08-28 PROCEDURE — 99213 OFFICE O/P EST LOW 20 MIN: CPT | Performed by: NURSE PRACTITIONER

## 2023-08-28 RX ORDER — BUPROPION HYDROCHLORIDE 100 MG/1
TABLET ORAL
Qty: 405 TABLET | Refills: 1 | Status: SHIPPED | OUTPATIENT
Start: 2023-08-28

## 2023-08-28 NOTE — PROGRESS NOTES
"Chief Complaint  Anxiety and Depression    Subjective          Marie Stout presents to [facility]  History of Present Illness  LUIZ/DEPRESSION:  She has been stressed with work.  They have taken their shift if off.  They have changed her hours.  She would like to increase if needed on the Wellbutrin.  Seizure/migraine: She is under the care of neurology.  She has not had a seizure in over 3 years.      Depression: Not at risk    PHQ-2 Score: 0    and 8/28/2023    BMI is within normal parameters. No other follow-up for BMI required.         Allergies  Patient has no known allergies.    Social History     Tobacco Use    Smoking status: Never    Smokeless tobacco: Never   Vaping Use    Vaping Use: Never used   Substance Use Topics    Alcohol use: Never    Drug use: Never       Family History   Problem Relation Age of Onset    Stroke Mother     Hypertension Mother     Hyperlipidemia Mother     Diabetes Mother     Diabetes type II Mother     Cervical cancer Mother     Diabetes Father     Diabetes type II Father         Health Maintenance Due   Topic Date Due    ANNUAL PHYSICAL  Never done        Immunization History   Administered Date(s) Administered    COVID-19 (MODERNA) 1st,2nd,3rd Dose Monovalent 05/30/2021, 06/27/2021    Fluzone >6mos 10/12/2021, 09/29/2022    Influenza, Unspecified 10/01/2018, 10/01/2019, 10/01/2020    Tdap 01/23/2020       Review of Systems   Constitutional:  Negative for fatigue.   Respiratory:  Negative for cough and shortness of breath.    Cardiovascular:  Negative for chest pain.   Gastrointestinal:  Negative for diarrhea, nausea and vomiting.      Objective       Vitals:    08/28/23 0724   BP: 110/73   Pulse: 93   Resp: 16   Temp: 98.2 øF (36.8 øC)   SpO2: 100%   Weight: 50.3 kg (111 lb)   Height: 157.5 cm (62\")       Body mass index is 20.3 kg/mý.         Physical Exam  Vitals reviewed.   Constitutional:       Appearance: Normal appearance. She is " well-developed.   Cardiovascular:      Rate and Rhythm: Normal rate and regular rhythm.      Heart sounds: Normal heart sounds. No murmur heard.  Pulmonary:      Effort: Pulmonary effort is normal.      Breath sounds: Normal breath sounds.   Neurological:      Mental Status: She is alert and oriented to person, place, and time.      Cranial Nerves: No cranial nerve deficit.      Motor: No weakness.   Psychiatric:         Mood and Affect: Mood and affect normal.           Result Review :     The following data was reviewed by: SHEYLA Rocha on 08/28/2023:    Common Labs   Common labs          1/17/2023    08:32   Common Labs   Glucose 98    BUN 10    Creatinine 0.97    Sodium 141    Potassium 3.7    Chloride 108    Calcium 8.8    Albumin 4.5    Total Bilirubin 0.2    Alkaline Phosphatase 37    AST (SGOT) 18    ALT (SGPT) 9    WBC 3.79    Hemoglobin 10.7    Hematocrit 33.9    Platelets 237                     Assessment and Plan      Diagnoses and all orders for this visit:    1. Generalized anxiety disorder (Primary)  -     buPROPion (WELLBUTRIN) 100 MG tablet; 2 tab am and 2 tab pm with 1/2 tab at lunch time  Dispense: 405 tablet; Refill: 1    2. Moderate episode of recurrent major depressive disorder            Follow Up     Return in about 6 months (around 2/28/2024), or if symptoms worsen or fail to improve.  Follow-up in 6 months for labs and appt. Call with any concerns or questions that you may have regarding your medications or history.    We will increase 50mg daily and keep me posted.  She is not fasting today.  We will do fasting blood work at 6 months.  Patient was given instructions and counseling regarding her condition or for health maintenance advice. Please see specific information pulled into the AVS if appropriate.   She has not had any sz in a very long time and is f.u with neurology.  She is aware of the the risk with wellbutrin and sz.    Parts of this note are electronic  transcriptions/translations of spoken language to printed text using the Dragon Dictation system.          SHEYLA Rocha  08/28/2023Answers submitted by the patient for this visit:  Other (Submitted on 8/21/2023)  Please describe your symptoms.: Rx refills  Have you had these symptoms before?: Yes  How long have you been having these symptoms?: Greater than 2 weeks  Please list any medications you are currently taking for this condition.: Wellbutrin  Primary Reason for Visit (Submitted on 8/21/2023)  What is the primary reason for your visit?: Other

## 2024-01-12 NOTE — PROGRESS NOTES
"  HPI:   44 y.o. . Presents for well woman exam. Contraception:  Vasectomy, negative sperm count following procedure  Menses:   q month, lasts 4-8 days, changes super tampon q 1hrs on heaviest days.   Pain:   mild lasting 2 days  Last pap: normal IGP,rfx Aptima HPV All Pth (2021 09:32)  Complaints: heavy menstrual bleeding for the last year    Past Medical History:   Diagnosis Date    Anemia     Anxiety     Depression     Generalized anxiety disorder 10/22/2018    Major depressive disorder 10/22/2018    Migraine headache without aura     Mild episode of recurrent major depressive disorder 2020    Seizures      LAST KNOWN SEIZURE      History reviewed. No pertinent surgical history.   Family History   Problem Relation Age of Onset    Stroke Mother     Hypertension Mother     Hyperlipidemia Mother     Diabetes Mother     Diabetes type II Mother     Cervical cancer Mother     Diabetes Father     Diabetes type II Father      Allergies as of 2024    (No Known Allergies)        PCP: does manage PMHx and preventative labs    /80   Pulse 96   Ht 157.5 cm (62\")   Wt 51.7 kg (114 lb)   LMP 2023 Comment: 23 cycle lasted 10 days  Breastfeeding No   BMI 20.85 kg/m²     PHYSICAL EXAM: Chaperone present   General- NAD, alert and oriented, appropriate  Psych- Normal mood, good memory  Neck- No masses, no thyroid enlargement  Lymphatic- No palpable neck, axillary, or groin nodes  CV- Regular rhythm, no murmurs  Resp- CTA to bases, no wheezes  Abdomen- Soft, non distended, non tender, no masses  Breast left-  Bilaterally symmetrical, no masses, non tender, no nipple discharge  Breast right- Bilaterally symmetrical, no masses, non tender, no nipple discharge  External genitalia- Normal female, no lesions  Urethra/meatus- Normal, no masses, non tender, no prolapse  Bladder- Normal, no masses, non tender, no prolapse  Vagina- Normal, no atrophy, no lesions, moderate menstrual " blood in vault, no prolapse  Cvx- Normal, no lesions, active menstruation, No cervical motion tenderness  Uterus- Normal size, shape & consistency.  Non tender, mobile.  Adnexa- No mass, non tender  Anus/Rectum/Perineum- Not performed  Ext- No edema, no cyanosis    Skin- No lesions, no rashes, no acanthosis nigricans    ASSESSMENT and PLAN:    Diagnoses and all orders for this visit:    1. Well woman exam (Primary)  -     IgP, Aptima HPV    2. Abnormal uterine bleeding (AUB)  -     CBC (No Diff)  -     TSH  -     Prolactin  -     US Pelvis Transvaginal Non OB; Future  -     POC Pregnancy, Urine  -     Iron Profile  -     Norethin-Eth Estrad-Fe Biphas (Lo Loestrin Fe) 1 MG-10 MCG / 10 MCG tablet; Take 1 tablet by mouth Daily.  Dispense: 84 tablet; Refill: 3    3. Encounter for screening mammogram for malignant neoplasm of breast  -     Mammo Screening Digital Tomosynthesis Bilateral With CAD; Future      Preventative:   BREAST HEALTH- Monthly self breast exam importance and how to reviewed. MMG and/or MRI (prn) reviewed per society guidelines and her individual history. Screening Imaging: Updated today  CERVICAL CANCER Screening- Reviewed current ASCCP guidelines for screening w and wo cotest HPV, age specific.  Screen: Updated today  COLON CANCER Screening- Reviewed current medical society guidelines and options.  Screen:  Not medically needed  SEXUAL HEALTH: Declines STD screening  BONE HEALTH- Reviewed current medical society guidelines and options for testing, calcium and vit D intake.  Weight bearing exercise.  DEXA: Not medically needed  VACCINATIONS Recommended: COVID and booster PRN  Importance discussed, risk being unvaccinated reviewed.  Questions answered  Genetic testing: Does not qualify.  Smoking status- NON SMOKER  Follow up PCP/Specialist PMHx and Labs  TRACK MENSES, RTO if <q21d, >7d long, heavy or painful.    All BIRTH CONTROL options R/B/A/SE/E of each reviewed in detail.  OCP/hormone use risk  THROMBOEMBOLIC RISK reviewed.     All treatment options with regard to pt hx NLT hormonal, surgical, expectant R/B/A/SE/E    She understands the importance of having any ordered tests to be performed in a timely fashion.  The risks of not performing them include, but are not limited to, advanced cancer stages, bone loss from osteoporosis and/or subsequent increase in morbidity and/or mortality.  She is encouraged to review her results online and/or contact or office if she has questions.   Desires to restart CHC for management of heavy bleeding,  had vasectomy  Patient left office before leaving POC pregnancy test. A telephone encounter was placed.  Follow Up:  Return for after complete ultrasound.        Marie Elliott, APRN  01/23/2024

## 2024-01-23 ENCOUNTER — TELEPHONE (OUTPATIENT)
Dept: OBSTETRICS AND GYNECOLOGY | Facility: CLINIC | Age: 44
End: 2024-01-23

## 2024-01-23 ENCOUNTER — OFFICE VISIT (OUTPATIENT)
Dept: OBSTETRICS AND GYNECOLOGY | Facility: CLINIC | Age: 44
End: 2024-01-23
Payer: COMMERCIAL

## 2024-01-23 VITALS
HEART RATE: 96 BPM | WEIGHT: 114 LBS | SYSTOLIC BLOOD PRESSURE: 116 MMHG | HEIGHT: 62 IN | BODY MASS INDEX: 20.98 KG/M2 | DIASTOLIC BLOOD PRESSURE: 80 MMHG

## 2024-01-23 DIAGNOSIS — Z01.419 WELL WOMAN EXAM: Primary | ICD-10-CM

## 2024-01-23 DIAGNOSIS — Z12.31 ENCOUNTER FOR SCREENING MAMMOGRAM FOR MALIGNANT NEOPLASM OF BREAST: ICD-10-CM

## 2024-01-23 DIAGNOSIS — N93.9 ABNORMAL UTERINE BLEEDING (AUB): ICD-10-CM

## 2024-01-23 LAB
B-HCG UR QL: NORMAL
DEPRECATED RDW RBC AUTO: 47 FL (ref 37–54)
ERYTHROCYTE [DISTWIDTH] IN BLOOD BY AUTOMATED COUNT: 15.9 % (ref 12.3–15.4)
EXPIRATION DATE: NORMAL
HCT VFR BLD AUTO: 25.1 % (ref 34–46.6)
HGB BLD-MCNC: 7.3 G/DL (ref 12–15.9)
INTERNAL NEGATIVE CONTROL: NORMAL
INTERNAL POSITIVE CONTROL: NORMAL
IRON 24H UR-MRATE: 16 MCG/DL (ref 37–145)
IRON SATN MFR SERPL: 3 % (ref 20–50)
Lab: NORMAL
MCH RBC QN AUTO: 23.9 PG (ref 26.6–33)
MCHC RBC AUTO-ENTMCNC: 29.1 G/DL (ref 31.5–35.7)
MCV RBC AUTO: 82 FL (ref 79–97)
PLATELET # BLD AUTO: 342 10*3/MM3 (ref 140–450)
PMV BLD AUTO: 12.3 FL (ref 6–12)
PROLACTIN SERPL-MCNC: 9.2 NG/ML (ref 4.79–23.3)
RBC # BLD AUTO: 3.06 10*6/MM3 (ref 3.77–5.28)
TIBC SERPL-MCNC: 499 MCG/DL (ref 298–536)
TRANSFERRIN SERPL-MCNC: 335 MG/DL (ref 200–360)
TSH SERPL DL<=0.05 MIU/L-ACNC: 2.51 UIU/ML (ref 0.27–4.2)
WBC NRBC COR # BLD AUTO: 4.14 10*3/MM3 (ref 3.4–10.8)

## 2024-01-23 PROCEDURE — 84443 ASSAY THYROID STIM HORMONE: CPT | Performed by: NURSE PRACTITIONER

## 2024-01-23 PROCEDURE — 84466 ASSAY OF TRANSFERRIN: CPT | Performed by: NURSE PRACTITIONER

## 2024-01-23 PROCEDURE — 81025 URINE PREGNANCY TEST: CPT | Performed by: NURSE PRACTITIONER

## 2024-01-23 PROCEDURE — 99213 OFFICE O/P EST LOW 20 MIN: CPT | Performed by: NURSE PRACTITIONER

## 2024-01-23 PROCEDURE — 87624 HPV HI-RISK TYP POOLED RSLT: CPT

## 2024-01-23 PROCEDURE — G0123 SCREEN CERV/VAG THIN LAYER: HCPCS

## 2024-01-23 PROCEDURE — 84146 ASSAY OF PROLACTIN: CPT | Performed by: NURSE PRACTITIONER

## 2024-01-23 PROCEDURE — 99396 PREV VISIT EST AGE 40-64: CPT | Performed by: NURSE PRACTITIONER

## 2024-01-23 PROCEDURE — 83540 ASSAY OF IRON: CPT | Performed by: NURSE PRACTITIONER

## 2024-01-23 PROCEDURE — 85027 COMPLETE CBC AUTOMATED: CPT | Performed by: NURSE PRACTITIONER

## 2024-01-23 RX ORDER — NORETHINDRONE ACETATE AND ETHINYL ESTRADIOL, ETHINYL ESTRADIOL AND FERROUS FUMARATE 1MG-10(24)
1 KIT ORAL DAILY
Qty: 84 TABLET | Refills: 3 | Status: SHIPPED | OUTPATIENT
Start: 2024-01-23

## 2024-01-23 NOTE — PATIENT INSTRUCTIONS
Venipuncture Blood Specimen Collection  Venipuncture performed in left arm by Ruchi Leo with good hemostasis. Patient tolerated the procedure well without complications.   01/23/24   Ruchi Leo

## 2024-01-24 DIAGNOSIS — D50.0 IRON DEFICIENCY ANEMIA DUE TO CHRONIC BLOOD LOSS: Primary | ICD-10-CM

## 2024-01-24 RX ORDER — FERROUS SULFATE 325(65) MG
325 TABLET ORAL 2 TIMES DAILY WITH MEALS
Qty: 180 TABLET | Refills: 0 | Status: SHIPPED | OUTPATIENT
Start: 2024-01-24

## 2024-01-26 LAB
CYTOLOGIST CVX/VAG CYTO: ABNORMAL
CYTOLOGY CVX/VAG DOC CYTO: ABNORMAL
CYTOLOGY CVX/VAG DOC THIN PREP: ABNORMAL
DX ICD CODE: ABNORMAL
HIV 1 & 2 AB SER-IMP: ABNORMAL
HPV I/H RISK 4 DNA CVX QL PROBE+SIG AMP: POSITIVE
Lab: ABNORMAL
OTHER STN SPEC: ABNORMAL
RECOM F/U CVX/VAG CYTO: ABNORMAL
STAT OF ADQ CVX/VAG CYTO-IMP: ABNORMAL

## 2024-01-30 ENCOUNTER — TELEPHONE (OUTPATIENT)
Dept: OBSTETRICS AND GYNECOLOGY | Facility: CLINIC | Age: 44
End: 2024-01-30

## 2024-01-30 NOTE — TELEPHONE ENCOUNTER
----- Message from SHEYLA Alves sent at 1/24/2024  1:50 PM EST -----  Consulted with Dr. Rider regarding lab work, heavy menses x 1 year. Her hgb was low 7.3, Iron low, likely due to menstrual blood loss, has been trending up and down over the past 3 years per her records. We need to get her started on iron, 325mg po bid, take with food if nausea or GI upset. Additionally We will need to repeat her iron levels in 1 month. Start the CHC as prescribed, hopefully this will lighten her cycles. Additionally, we will continue with plans for the ultrasound to evaluate for structural abnormalities of the uterus/ovaries. If she develops shortness of breath, chest pain or dizziness, recommend call 911 and get to ER as if the iron level continues to drop she may require a transfusion. I will be in touch otherwise once the ultrasound is completed.

## 2024-02-06 RX ORDER — NORGESTIMATE AND ETHINYL ESTRADIOL 7DAYSX3 LO
1 KIT ORAL DAILY
Qty: 28 TABLET | Refills: 12 | Status: SHIPPED | OUTPATIENT
Start: 2024-02-06 | End: 2025-02-05

## 2024-02-24 DIAGNOSIS — F41.1 GENERALIZED ANXIETY DISORDER: ICD-10-CM

## 2024-02-26 RX ORDER — BUPROPION HYDROCHLORIDE 100 MG/1
TABLET ORAL
Qty: 135 TABLET | Refills: 0 | OUTPATIENT
Start: 2024-02-26

## 2024-02-27 DIAGNOSIS — F41.1 GENERALIZED ANXIETY DISORDER: ICD-10-CM

## 2024-03-04 RX ORDER — BUPROPION HYDROCHLORIDE 100 MG/1
TABLET ORAL
Qty: 150 TABLET | Refills: 0 | Status: SHIPPED | OUTPATIENT
Start: 2024-03-04

## 2024-03-13 ENCOUNTER — OFFICE VISIT (OUTPATIENT)
Dept: FAMILY MEDICINE CLINIC | Facility: CLINIC | Age: 44
End: 2024-03-13
Payer: COMMERCIAL

## 2024-03-13 VITALS
RESPIRATION RATE: 12 BRPM | HEART RATE: 83 BPM | OXYGEN SATURATION: 100 % | WEIGHT: 117.9 LBS | BODY MASS INDEX: 21.7 KG/M2 | TEMPERATURE: 97.7 F | HEIGHT: 62 IN | SYSTOLIC BLOOD PRESSURE: 110 MMHG | DIASTOLIC BLOOD PRESSURE: 74 MMHG

## 2024-03-13 DIAGNOSIS — Z51.81 ENCOUNTER FOR MEDICATION MONITORING: ICD-10-CM

## 2024-03-13 DIAGNOSIS — Z13.220 SCREENING CHOLESTEROL LEVEL: Primary | ICD-10-CM

## 2024-03-13 DIAGNOSIS — D50.0 IRON DEFICIENCY ANEMIA DUE TO CHRONIC BLOOD LOSS: ICD-10-CM

## 2024-03-13 DIAGNOSIS — F41.1 GENERALIZED ANXIETY DISORDER: ICD-10-CM

## 2024-03-13 LAB
ALBUMIN SERPL-MCNC: 4.4 G/DL (ref 3.5–5.2)
ALBUMIN/GLOB SERPL: 1.7 G/DL
ALP SERPL-CCNC: 39 U/L (ref 39–117)
ALT SERPL W P-5'-P-CCNC: 11 U/L (ref 1–33)
ANION GAP SERPL CALCULATED.3IONS-SCNC: 12 MMOL/L (ref 5–15)
AST SERPL-CCNC: 18 U/L (ref 1–32)
BILIRUB SERPL-MCNC: <0.2 MG/DL (ref 0–1.2)
BUN SERPL-MCNC: 7 MG/DL (ref 6–20)
BUN/CREAT SERPL: 8 (ref 7–25)
CALCIUM SPEC-SCNC: 8.7 MG/DL (ref 8.6–10.5)
CHLORIDE SERPL-SCNC: 109 MMOL/L (ref 98–107)
CHOLEST SERPL-MCNC: 215 MG/DL (ref 0–200)
CO2 SERPL-SCNC: 22 MMOL/L (ref 22–29)
CREAT SERPL-MCNC: 0.88 MG/DL (ref 0.57–1)
EGFRCR SERPLBLD CKD-EPI 2021: 84.3 ML/MIN/1.73
GLOBULIN UR ELPH-MCNC: 2.6 GM/DL
GLUCOSE SERPL-MCNC: 85 MG/DL (ref 65–99)
HCT VFR BLD AUTO: 36.3 % (ref 34–46.6)
HDLC SERPL-MCNC: 68 MG/DL (ref 40–60)
HGB BLD-MCNC: 10.6 G/DL (ref 12–15.9)
IRON 24H UR-MRATE: 45 MCG/DL (ref 37–145)
IRON SATN MFR SERPL: 10 % (ref 20–50)
LDLC SERPL CALC-MCNC: 127 MG/DL (ref 0–100)
LDLC/HDLC SERPL: 1.83 {RATIO}
MCH RBC QN AUTO: 25.2 PG (ref 26.6–33)
MCHC RBC AUTO-ENTMCNC: 29.2 G/DL (ref 31.5–35.7)
MCV RBC AUTO: 86.2 FL (ref 79–97)
PLATELET # BLD AUTO: 226 10*3/MM3 (ref 140–450)
PMV BLD AUTO: 12.3 FL (ref 6–12)
POTASSIUM SERPL-SCNC: 3.8 MMOL/L (ref 3.5–5.2)
PROT SERPL-MCNC: 7 G/DL (ref 6–8.5)
RBC # BLD AUTO: 4.21 10*6/MM3 (ref 3.77–5.28)
SODIUM SERPL-SCNC: 143 MMOL/L (ref 136–145)
TIBC SERPL-MCNC: 432 MCG/DL (ref 298–536)
TRANSFERRIN SERPL-MCNC: 290 MG/DL (ref 200–360)
TRIGL SERPL-MCNC: 113 MG/DL (ref 0–150)
VLDLC SERPL-MCNC: 20 MG/DL (ref 5–40)
WBC NRBC COR # BLD AUTO: 4.85 10*3/MM3 (ref 3.4–10.8)

## 2024-03-13 PROCEDURE — 85027 COMPLETE CBC AUTOMATED: CPT | Performed by: NURSE PRACTITIONER

## 2024-03-13 PROCEDURE — 84466 ASSAY OF TRANSFERRIN: CPT | Performed by: NURSE PRACTITIONER

## 2024-03-13 PROCEDURE — 80053 COMPREHEN METABOLIC PANEL: CPT | Performed by: NURSE PRACTITIONER

## 2024-03-13 PROCEDURE — 83540 ASSAY OF IRON: CPT | Performed by: NURSE PRACTITIONER

## 2024-03-13 PROCEDURE — 80061 LIPID PANEL: CPT | Performed by: NURSE PRACTITIONER

## 2024-03-13 RX ORDER — BUPROPION HYDROCHLORIDE 100 MG/1
TABLET ORAL
Qty: 405 TABLET | Refills: 1 | Status: SHIPPED | OUTPATIENT
Start: 2024-03-13

## 2024-03-13 NOTE — PROGRESS NOTES
Chief Complaint  Anxiety    Subjective          Marie Stout presents to [facility]  History of Present Illness  LUIZ/DEPRESSION:  She has been stressed with the loss of her mom.  Her brother is out of state and really does not have the best relationship with her parents and so therefore it is pretty much her to take care of her dad since her mom has passed.  She is taking the Wellbutrin 2 in the morning 2 in the evening and half during the day and that is doing well..  Seizure/migraine: She is under the care of neurology.  She has not had a seizure in over 3 years.  Birth control:  She is followed by GYN.  She is due labs as it as been since 7/2022.    Depression: Not at risk (3/13/2024)    PHQ-2     PHQ-2 Score: 0    and 3/13/2024    BMI is within normal parameters. No other follow-up for BMI required.         Allergies  Patient has no known allergies.    Social History     Tobacco Use    Smoking status: Never    Smokeless tobacco: Never   Vaping Use    Vaping status: Never Used   Substance Use Topics    Alcohol use: Never    Drug use: Never       Family History   Problem Relation Age of Onset    Stroke Mother     Hypertension Mother     Hyperlipidemia Mother     Diabetes Mother     Diabetes type II Mother     Cervical cancer Mother     Diabetes Father     Diabetes type II Father         Health Maintenance Due   Topic Date Due    ANNUAL PHYSICAL  Never done        Immunization History   Administered Date(s) Administered    COVID-19 (MODERNA) 1st,2nd,3rd Dose Monovalent 05/30/2021, 06/27/2021    Fluzone (or Fluarix & Flulaval for VFC) >6mos 10/12/2021, 09/29/2022    Influenza, Unspecified 10/01/2018, 10/01/2019, 10/01/2020    Tdap 01/23/2020       Review of Systems   Constitutional:  Negative for fatigue.   Respiratory:  Negative for cough and shortness of breath.    Cardiovascular:  Negative for chest pain.   Gastrointestinal:  Negative for diarrhea, nausea and vomiting.     "    Objective       Vitals:    03/13/24 0730   BP: 110/74   Pulse: 83   Resp: 12   Temp: 97.7 °F (36.5 °C)   SpO2: 100%   Weight: 53.5 kg (117 lb 14.4 oz)   Height: 157.5 cm (62\")       Body mass index is 21.56 kg/m².         Physical Exam  Vitals reviewed.   Constitutional:       Appearance: Normal appearance. She is well-developed.   Cardiovascular:      Rate and Rhythm: Normal rate and regular rhythm.      Heart sounds: Normal heart sounds. No murmur heard.  Pulmonary:      Effort: Pulmonary effort is normal.      Breath sounds: Normal breath sounds.   Neurological:      Mental Status: She is alert and oriented to person, place, and time.      Cranial Nerves: No cranial nerve deficit.      Motor: No weakness.   Psychiatric:         Mood and Affect: Mood and affect normal.             Result Review :     The following data was reviewed by: SHEYLA Rocha on 03/13/2024:    Common Labs   Common labs          1/23/2024    10:40 3/13/2024    07:57   Common Labs   Glucose  85    BUN  7    Creatinine  0.88    Sodium  143    Potassium  3.8    Chloride  109    Calcium  8.7    Albumin  4.4    Total Bilirubin  <0.2    Alkaline Phosphatase  39    AST (SGOT)  18    ALT (SGPT)  11    WBC 4.14  4.85    Hemoglobin 7.3  10.6    Hematocrit 25.1  36.3    Platelets 342  226    Total Cholesterol  215    Triglycerides  113    HDL Cholesterol  68    LDL Cholesterol   127                     Assessment and Plan      Diagnoses and all orders for this visit:    1. Screening cholesterol level (Primary)  -     Lipid Panel    2. Encounter for medication monitoring  -     Comprehensive Metabolic Panel    3. Generalized anxiety disorder  -     buPROPion (WELLBUTRIN) 100 MG tablet; Take 2 tablets by mouth in the morning and 2 tabs in the evening with 1/2 tab at lunch time  Dispense: 405 tablet; Refill: 1    4. Iron deficiency anemia due to chronic blood loss  -     CBC (No Diff)  -     Iron Profile            Follow Up     Return in " about 6 months (around 9/13/2024).  Follow-up in 6 months for labs and appt. Call with any concerns or questions that you may have regarding your medications or history.    I have reviewed all medications and at this time no medications changes need to be adjusted for all chronic conditions.  She is trying to talk her dad and to come in and see me.  So just let me know when they call if he does choose to come here to see me then she just needs to mention her name.  Patient was given instructions and counseling regarding her condition or for health maintenance advice. Please see specific information pulled into the AVS if appropriate.     Parts of this note are electronic transcriptions/translations of spoken language to printed text using the Dragon Dictation system.          Jackie Hinds, SHEYLA  03/13/2024  Answers submitted by the patient for this visit:  Other (Submitted on 3/13/2024)  Please describe your symptoms.: Refill  Have you had these symptoms before?: Yes  How long have you been having these symptoms?: Greater than 2 weeks  Please list any medications you are currently taking for this condition.: On rx chart  Primary Reason for Visit (Submitted on 3/13/2024)  What is the primary reason for your visit?: Other

## 2024-03-15 ENCOUNTER — PATIENT ROUNDING (BHMG ONLY) (OUTPATIENT)
Dept: FAMILY MEDICINE CLINIC | Facility: CLINIC | Age: 44
End: 2024-03-15
Payer: COMMERCIAL

## 2024-03-15 ENCOUNTER — TELEPHONE (OUTPATIENT)
Dept: OBSTETRICS AND GYNECOLOGY | Facility: CLINIC | Age: 44
End: 2024-03-15
Payer: COMMERCIAL

## 2024-03-15 NOTE — TELEPHONE ENCOUNTER
Patient called back and informed of her results and recommendations. She states her bleeding is back to normal. She understands she needs a follow up in 2 months to include recollecting her pap smear and will call back to schedule.

## 2024-03-15 NOTE — TELEPHONE ENCOUNTER
----- Message from SHEYLA Alves sent at 3/14/2024  4:50 PM EDT -----  Hgb and iron levels improving, continue daily iron supplement for 30 more days. Has her bleeding improved on OCP? If so, we will see her back in 2 more months for follow up at which time we will recollect the pap smear, too much bleeding on day of exam and inadequate specimen

## 2024-03-15 NOTE — PROGRESS NOTES
A My-Chart message has been sent to the patient for PATIENT ROUNDING with [facility].

## 2024-08-26 ENCOUNTER — TELEMEDICINE (OUTPATIENT)
Dept: FAMILY MEDICINE CLINIC | Facility: TELEHEALTH | Age: 44
End: 2024-08-26
Payer: COMMERCIAL

## 2024-08-26 VITALS — TEMPERATURE: 97.5 F | HEART RATE: 84 BPM

## 2024-08-26 DIAGNOSIS — J06.9 VIRAL URI: Primary | ICD-10-CM

## 2024-08-26 NOTE — PATIENT INSTRUCTIONS
-covid and flu negative today  -supportive care  -sudafed, afrin, saline spray  -should have improvement in the next 2-5 days

## 2024-08-26 NOTE — PROGRESS NOTES
Subjective   Marie Stout is a 44 y.o. female.     History of Present Illness  Symptoms started 5 days ago with fatigue, runny nose. Home covid test is negative. 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 She has taken tylenol/ibuprofen.       The following portions of the patient's history were reviewed and updated as appropriate: allergies, current medications, past family history, past medical history, past social history, past surgical history, and problem list.    Review of Systems   Constitutional:  Positive for fatigue. Negative for fever.   HENT:  Positive for congestion, rhinorrhea and sore throat. Negative for ear pain.         Food doesn't taste right   Respiratory:  Negative for cough, shortness of breath and wheezing.    Gastrointestinal: Negative.    Musculoskeletal:  Negative for myalgias.   Skin:  Negative for rash.   Neurological:  Positive for headache.       Objective   Physical Exam  Constitutional:       General: She is not in acute distress.     Appearance: She is well-developed. She is not diaphoretic.   HENT:      Nose: Congestion present.      Right Sinus: No maxillary sinus tenderness or frontal sinus tenderness.      Left Sinus: No maxillary sinus tenderness or frontal sinus tenderness.      Mouth/Throat:      Pharynx: Posterior oropharyngeal erythema present.      Tonsils: 0 on the right. 0 on the left.   Pulmonary:      Effort: Pulmonary effort is normal.   Neurological:      Mental Status: She is alert and oriented to person, place, and time.   Psychiatric:         Behavior: Behavior normal.           Assessment & Plan   Diagnoses and all orders for this visit:    1. Viral URI (Primary)  -     ELISABETH FLU + SARS PCR; Future    -covid and flu negative today  -supportive care  -sudafed, afrin, saline spray  -should have improvement in the next 2-5 days           The use of a video visit has been reviewed with the patient and verbal informed consent has been obtained. Myself and  Marie Stout participated in this visit. The patient is located in [place], [place] . I am located in [place], [place]. [employer] and [employer] were utilized. I spent 12 minutes in the patient's chart for this visit.

## 2024-09-05 ENCOUNTER — TELEPHONE (OUTPATIENT)
Dept: FAMILY MEDICINE CLINIC | Facility: CLINIC | Age: 44
End: 2024-09-05
Payer: COMMERCIAL

## 2024-09-10 ENCOUNTER — PATIENT MESSAGE (OUTPATIENT)
Dept: OBSTETRICS AND GYNECOLOGY | Facility: CLINIC | Age: 44
End: 2024-09-10
Payer: COMMERCIAL

## 2024-09-12 ENCOUNTER — OFFICE VISIT (OUTPATIENT)
Dept: FAMILY MEDICINE CLINIC | Facility: CLINIC | Age: 44
End: 2024-09-12
Payer: COMMERCIAL

## 2024-09-12 ENCOUNTER — TELEPHONE (OUTPATIENT)
Dept: OBSTETRICS AND GYNECOLOGY | Facility: CLINIC | Age: 44
End: 2024-09-12
Payer: COMMERCIAL

## 2024-09-12 VITALS
TEMPERATURE: 97.8 F | OXYGEN SATURATION: 100 % | SYSTOLIC BLOOD PRESSURE: 116 MMHG | WEIGHT: 113.1 LBS | BODY MASS INDEX: 20.81 KG/M2 | HEIGHT: 62 IN | HEART RATE: 77 BPM | DIASTOLIC BLOOD PRESSURE: 89 MMHG

## 2024-09-12 DIAGNOSIS — D50.0 IRON DEFICIENCY ANEMIA DUE TO CHRONIC BLOOD LOSS: Primary | ICD-10-CM

## 2024-09-12 DIAGNOSIS — F41.1 GENERALIZED ANXIETY DISORDER: ICD-10-CM

## 2024-09-12 DIAGNOSIS — G40.B09: ICD-10-CM

## 2024-09-12 LAB
ALBUMIN SERPL-MCNC: 4.3 G/DL (ref 3.5–5.2)
ALBUMIN/GLOB SERPL: 1.7 G/DL
ALP SERPL-CCNC: 44 U/L (ref 39–117)
ALT SERPL W P-5'-P-CCNC: 10 U/L (ref 1–33)
ANION GAP SERPL CALCULATED.3IONS-SCNC: 9.2 MMOL/L (ref 5–15)
AST SERPL-CCNC: 15 U/L (ref 1–32)
BASOPHILS # BLD AUTO: 0.04 10*3/MM3 (ref 0–0.2)
BASOPHILS NFR BLD AUTO: 1 % (ref 0–1.5)
BILIRUB SERPL-MCNC: 0.2 MG/DL (ref 0–1.2)
BUN SERPL-MCNC: 11 MG/DL (ref 6–20)
BUN/CREAT SERPL: 12.4 (ref 7–25)
CALCIUM SPEC-SCNC: 8.9 MG/DL (ref 8.6–10.5)
CHLORIDE SERPL-SCNC: 108 MMOL/L (ref 98–107)
CO2 SERPL-SCNC: 23.8 MMOL/L (ref 22–29)
CREAT SERPL-MCNC: 0.89 MG/DL (ref 0.57–1)
DEPRECATED RDW RBC AUTO: 45.1 FL (ref 37–54)
EGFRCR SERPLBLD CKD-EPI 2021: 83.1 ML/MIN/1.73
EOSINOPHIL # BLD AUTO: 0.07 10*3/MM3 (ref 0–0.4)
EOSINOPHIL NFR BLD AUTO: 1.8 % (ref 0.3–6.2)
ERYTHROCYTE [DISTWIDTH] IN BLOOD BY AUTOMATED COUNT: 13.3 % (ref 12.3–15.4)
GLOBULIN UR ELPH-MCNC: 2.6 GM/DL
GLUCOSE SERPL-MCNC: 84 MG/DL (ref 65–99)
HCT VFR BLD AUTO: 33.2 % (ref 34–46.6)
HGB BLD-MCNC: 10.8 G/DL (ref 12–15.9)
IMM GRANULOCYTES # BLD AUTO: 0 10*3/MM3 (ref 0–0.05)
IMM GRANULOCYTES NFR BLD AUTO: 0 % (ref 0–0.5)
IRON 24H UR-MRATE: 39 MCG/DL (ref 37–145)
IRON SATN MFR SERPL: 10 % (ref 20–50)
LYMPHOCYTES # BLD AUTO: 1.73 10*3/MM3 (ref 0.7–3.1)
LYMPHOCYTES NFR BLD AUTO: 45.1 % (ref 19.6–45.3)
MCH RBC QN AUTO: 30.3 PG (ref 26.6–33)
MCHC RBC AUTO-ENTMCNC: 32.5 G/DL (ref 31.5–35.7)
MCV RBC AUTO: 93.3 FL (ref 79–97)
MONOCYTES # BLD AUTO: 0.37 10*3/MM3 (ref 0.1–0.9)
MONOCYTES NFR BLD AUTO: 9.6 % (ref 5–12)
NEUTROPHILS NFR BLD AUTO: 1.63 10*3/MM3 (ref 1.7–7)
NEUTROPHILS NFR BLD AUTO: 42.5 % (ref 42.7–76)
NRBC BLD AUTO-RTO: 0 /100 WBC (ref 0–0.2)
PLATELET # BLD AUTO: 268 10*3/MM3 (ref 140–450)
PMV BLD AUTO: 12.5 FL (ref 6–12)
POTASSIUM SERPL-SCNC: 3.8 MMOL/L (ref 3.5–5.2)
PROT SERPL-MCNC: 6.9 G/DL (ref 6–8.5)
RBC # BLD AUTO: 3.56 10*6/MM3 (ref 3.77–5.28)
SODIUM SERPL-SCNC: 141 MMOL/L (ref 136–145)
TIBC SERPL-MCNC: 410 MCG/DL (ref 298–536)
TRANSFERRIN SERPL-MCNC: 275 MG/DL (ref 200–360)
WBC NRBC COR # BLD AUTO: 3.84 10*3/MM3 (ref 3.4–10.8)

## 2024-09-12 PROCEDURE — 85025 COMPLETE CBC W/AUTO DIFF WBC: CPT | Performed by: NURSE PRACTITIONER

## 2024-09-12 PROCEDURE — 99214 OFFICE O/P EST MOD 30 MIN: CPT | Performed by: NURSE PRACTITIONER

## 2024-09-12 PROCEDURE — 80053 COMPREHEN METABOLIC PANEL: CPT | Performed by: NURSE PRACTITIONER

## 2024-09-12 PROCEDURE — 84466 ASSAY OF TRANSFERRIN: CPT | Performed by: NURSE PRACTITIONER

## 2024-09-12 PROCEDURE — 83540 ASSAY OF IRON: CPT | Performed by: NURSE PRACTITIONER

## 2024-09-12 RX ORDER — BUPROPION HYDROCHLORIDE 100 MG/1
TABLET ORAL
Qty: 405 TABLET | Refills: 1 | Status: SHIPPED | OUTPATIENT
Start: 2024-09-12

## 2024-09-12 NOTE — PROGRESS NOTES
Chief Complaint  Anxiety and Hyperlipidemia    Subjective          Marie Stout presents to [facility]  History of Present Illness  Here for check up: She is here with her  as she had a seizure on Tuesday while at work because she had been out of her Klonopin since Monday.  She has been on the Klonopin since 1996-97 with neurology.  She is under the care of [facility] currently  LUIZ/DEPRESSION:  She has been stressed with the loss of her mom, her boys of went on to college and she and her  were out to eat the other day and she just broke down with him at the restaurant and started to cry at the table.  She knows that her anxiety is elevated.  She does not want to really change medicine currently but she is going to see a psychiatry can assist her with the possibility of getting therapy.    Seizure/migraine: She is under the care of neurology.    We do need to check iron, comp and a CBC today    Depression: Not at risk (3/13/2024)    PHQ-2     PHQ-2 Score: 0    and 3/13/2024    BMI is within normal parameters. No other follow-up for BMI required.         Allergies  Patient has no known allergies.    Social History     Tobacco Use    Smoking status: Never    Smokeless tobacco: Never   Vaping Use    Vaping status: Never Used   Substance Use Topics    Alcohol use: Never    Drug use: Never       Family History   Problem Relation Age of Onset    Stroke Mother     Hypertension Mother     Hyperlipidemia Mother     Diabetes Mother     Diabetes type II Mother     Cervical cancer Mother     Diabetes Father     Diabetes type II Father         Health Maintenance Due   Topic Date Due    ANNUAL PHYSICAL  Never done    INFLUENZA VACCINE  08/01/2024        Immunization History   Administered Date(s) Administered    COVID-19 (MODERNA) 1st,2nd,3rd Dose Monovalent 05/30/2021, 06/27/2021    Fluzone (or Fluarix & Flulaval for VFC) >6mos 10/12/2021, 09/29/2022    Influenza, Unspecified  "10/01/2018, 10/01/2019, 10/01/2020    Tdap 01/23/2020       Review of Systems   Constitutional:  Positive for fatigue.   Respiratory:  Negative for cough and shortness of breath.    Cardiovascular:  Negative for chest pain.   Gastrointestinal:  Negative for diarrhea, nausea and vomiting.   Neurological:  Positive for seizures.        Objective       Vitals:    09/12/24 0809   BP: 116/89   BP Location: Left arm   Patient Position: Sitting   Cuff Size: Adult   Pulse: 77   Temp: 97.8 °F (36.6 °C)   SpO2: 100%   Weight: 51.3 kg (113 lb 1.6 oz)   Height: 157.5 cm (62\")       Body mass index is 20.69 kg/m².         Physical Exam  Vitals reviewed.   Constitutional:       Appearance: Normal appearance. She is well-developed.   Cardiovascular:      Rate and Rhythm: Normal rate and regular rhythm.      Heart sounds: Normal heart sounds. No murmur heard.  Pulmonary:      Effort: Pulmonary effort is normal.      Breath sounds: Normal breath sounds.   Neurological:      Mental Status: She is alert and oriented to person, place, and time.      Cranial Nerves: No cranial nerve deficit.      Motor: No weakness.   Psychiatric:         Mood and Affect: Affect normal.      Comments: Anxious today.  Her hands are tremoring some               Result Review :     The following data was reviewed by: SHEYLA Rocha on 09/12/2024:            No Images in the past 120 days found..              Assessment and Plan      Assessment & Plan  Generalized anxiety disorder   we will do a referral to psychiatry for the possibility of med changes but also to get her to therapy to discuss everything that she is going through.  With the loss of her mom and now she is taking care of her dad she said she does not really have a life.  Denies any thoughts of hurting herself.  They do not get to go to [place] this year but they are going to go to [place].  Iron deficiency anemia due to chronic blood loss    Non-refractory juvenile myoclonic " epilepsy      Orders Placed This Encounter   Procedures    Comprehensive metabolic panel    Iron Profile    CBC Auto Differential    Ambulatory Referral to Psychiatry    CBC w AUTO Differential     New Medications Ordered This Visit   Medications    buPROPion (WELLBUTRIN) 100 MG tablet     Sig: Take 2 tablets by mouth in the morning and 2 tabs in the evening with 1/2 tab at lunch time     Dispense:  405 tablet     Refill:  1          Diagnosis Plan   1. Iron deficiency anemia due to chronic blood loss  CBC w AUTO Differential    Comprehensive metabolic panel    Iron Profile      2. Generalized anxiety disorder  buPROPion (WELLBUTRIN) 100 MG tablet    CBC w AUTO Differential    Comprehensive metabolic panel    Iron Profile    Ambulatory Referral to Psychiatry      3. Non-refractory juvenile myoclonic epilepsy  CBC w AUTO Differential    Comprehensive metabolic panel                Follow Up     Return in about 6 months (around 3/12/2025).  Follow-up in 6 months for labs and appt. Call with any concerns or questions that you may have regarding your medications or history.    I did discuss with patient that I did speak to neurology last week and I thought they were taking care of her Klonopin.  I did advise if there is any time that she is not able to get a short.  Of her medication to let me know and I will reach out to neurology.  If patient wishes to change neurology then she will keep me posted.    Patient was given instructions and counseling regarding her condition or for health maintenance advice. Please see specific information pulled into the AVS if appropriate.     Parts of this note are electronic transcriptions/translations of spoken language to printed text using the Dragon Dictation system.          Jackie Hinds, SHEYLA  09/12/2024  Answers submitted by the patient for this visit:  Other (Submitted on 9/11/2024)  Please describe your symptoms.: Refill  Have you had these symptoms before?: Yes  How  long have you been having these symptoms?: Greater than 2 weeks  Please list any medications you are currently taking for this condition.: See list  Primary Reason for Visit (Submitted on 9/11/2024)  What is the primary reason for your visit?: Problem Not Listed

## 2024-09-12 NOTE — TELEPHONE ENCOUNTER
I received a note on my desk to please call Farnaz CARRION with [facility] and they will regarding this patient.  The phone number given was [phone].  I called approximately 1:30 in the afternoon and left a message.  Farnaz did call me back later that afternoon and we did discuss about patient's history in regards to Klonopin she said the patient had been talking to her at her appointment and said that she is really having anxiety and wants to increase her Klonopin but the Farnaz the nurse practitioner said that they do not usually keep the patient on Klonopin daily and that I would need to take over her Klonopin that she takes 0.5 mg twice a day.  I discussed with Farnaz that Dr. Aldrich has had her on the Klonopin since 1996 and it has been controlling her seizures for the most part.  She did have seizure around 3 to 4 years ago but has not had one since.  Farnaz did say that she was going to go ahead and discussed this with her doctor and because they are not certain if the Klonopin is keeping patient seizure-free that they will go ahead and continue the medicine.  Jackie Hinds, SHEYLA  09/05/2024

## 2024-09-12 NOTE — ASSESSMENT & PLAN NOTE
we will do a referral to psychiatry for the possibility of med changes but also to get her to therapy to discuss everything that she is going through.  With the loss of her mom and now she is taking care of her dad she said she does not really have a life.  Denies any thoughts of hurting herself.  They do not get to go to [place] this year but they are going to go to [place].

## 2024-09-13 DIAGNOSIS — Z12.31 SCREENING MAMMOGRAM FOR BREAST CANCER: Primary | ICD-10-CM

## 2025-03-06 ENCOUNTER — OFFICE VISIT (OUTPATIENT)
Dept: FAMILY MEDICINE CLINIC | Facility: CLINIC | Age: 45
End: 2025-03-06
Payer: COMMERCIAL

## 2025-03-06 VITALS
TEMPERATURE: 98.3 F | OXYGEN SATURATION: 100 % | BODY MASS INDEX: 21.22 KG/M2 | SYSTOLIC BLOOD PRESSURE: 126 MMHG | HEART RATE: 88 BPM | HEIGHT: 62 IN | RESPIRATION RATE: 16 BRPM | WEIGHT: 115.3 LBS | DIASTOLIC BLOOD PRESSURE: 82 MMHG

## 2025-03-06 DIAGNOSIS — D50.0 IRON DEFICIENCY ANEMIA DUE TO CHRONIC BLOOD LOSS: ICD-10-CM

## 2025-03-06 DIAGNOSIS — R68.82 DECREASED LIBIDO: ICD-10-CM

## 2025-03-06 DIAGNOSIS — F41.1 GENERALIZED ANXIETY DISORDER: ICD-10-CM

## 2025-03-06 DIAGNOSIS — G40.B09: ICD-10-CM

## 2025-03-06 DIAGNOSIS — F33.0 MILD EPISODE OF RECURRENT MAJOR DEPRESSIVE DISORDER: ICD-10-CM

## 2025-03-06 DIAGNOSIS — Z00.00 ANNUAL PHYSICAL EXAM: ICD-10-CM

## 2025-03-06 DIAGNOSIS — Z86.39 HISTORY OF VITAMIN D DEFICIENCY: Primary | ICD-10-CM

## 2025-03-06 LAB
25(OH)D3 SERPL-MCNC: 28.9 NG/ML (ref 30–100)
ALBUMIN SERPL-MCNC: 4.2 G/DL (ref 3.5–5.2)
ALBUMIN/GLOB SERPL: 1.7 G/DL
ALP SERPL-CCNC: 43 U/L (ref 39–117)
ALT SERPL W P-5'-P-CCNC: 8 U/L (ref 1–33)
ANION GAP SERPL CALCULATED.3IONS-SCNC: 10 MMOL/L (ref 5–15)
AST SERPL-CCNC: 16 U/L (ref 1–32)
BASOPHILS # BLD AUTO: 0.03 10*3/MM3 (ref 0–0.2)
BASOPHILS NFR BLD AUTO: 0.9 % (ref 0–1.5)
BILIRUB SERPL-MCNC: <0.2 MG/DL (ref 0–1.2)
BUN SERPL-MCNC: 9 MG/DL (ref 6–20)
BUN/CREAT SERPL: 10.7 (ref 7–25)
CALCIUM SPEC-SCNC: 8.7 MG/DL (ref 8.6–10.5)
CHLORIDE SERPL-SCNC: 108 MMOL/L (ref 98–107)
CHOLEST SERPL-MCNC: 215 MG/DL (ref 0–200)
CO2 SERPL-SCNC: 21 MMOL/L (ref 22–29)
CREAT SERPL-MCNC: 0.84 MG/DL (ref 0.57–1)
DEPRECATED RDW RBC AUTO: 49.9 FL (ref 37–54)
EGFRCR SERPLBLD CKD-EPI 2021: 88.6 ML/MIN/1.73
EOSINOPHIL # BLD AUTO: 0.02 10*3/MM3 (ref 0–0.4)
EOSINOPHIL NFR BLD AUTO: 0.6 % (ref 0.3–6.2)
ERYTHROCYTE [DISTWIDTH] IN BLOOD BY AUTOMATED COUNT: 15.1 % (ref 12.3–15.4)
FERRITIN SERPL-MCNC: 14.1 NG/ML (ref 13–150)
FOLATE SERPL-MCNC: 8.25 NG/ML (ref 4.78–24.2)
GLOBULIN UR ELPH-MCNC: 2.5 GM/DL
GLUCOSE SERPL-MCNC: 89 MG/DL (ref 65–99)
HCT VFR BLD AUTO: 33 % (ref 34–46.6)
HDLC SERPL-MCNC: 68 MG/DL (ref 40–60)
HGB BLD-MCNC: 10 G/DL (ref 12–15.9)
IMM GRANULOCYTES # BLD AUTO: 0 10*3/MM3 (ref 0–0.05)
IMM GRANULOCYTES NFR BLD AUTO: 0 % (ref 0–0.5)
IRON 24H UR-MRATE: 23 MCG/DL (ref 37–145)
IRON SATN MFR SERPL: 5 % (ref 20–50)
LDLC SERPL CALC-MCNC: 139 MG/DL (ref 0–100)
LDLC/HDLC SERPL: 2.03 {RATIO}
LYMPHOCYTES # BLD AUTO: 1.46 10*3/MM3 (ref 0.7–3.1)
LYMPHOCYTES NFR BLD AUTO: 42.2 % (ref 19.6–45.3)
MCH RBC QN AUTO: 27.2 PG (ref 26.6–33)
MCHC RBC AUTO-ENTMCNC: 30.3 G/DL (ref 31.5–35.7)
MCV RBC AUTO: 89.7 FL (ref 79–97)
MONOCYTES # BLD AUTO: 0.35 10*3/MM3 (ref 0.1–0.9)
MONOCYTES NFR BLD AUTO: 10.1 % (ref 5–12)
NEUTROPHILS NFR BLD AUTO: 1.6 10*3/MM3 (ref 1.7–7)
NEUTROPHILS NFR BLD AUTO: 46.2 % (ref 42.7–76)
NRBC BLD AUTO-RTO: 0 /100 WBC (ref 0–0.2)
PLATELET # BLD AUTO: 255 10*3/MM3 (ref 140–450)
PMV BLD AUTO: 12.2 FL (ref 6–12)
POTASSIUM SERPL-SCNC: 4.1 MMOL/L (ref 3.5–5.2)
PROT SERPL-MCNC: 6.7 G/DL (ref 6–8.5)
RBC # BLD AUTO: 3.68 10*6/MM3 (ref 3.77–5.28)
SODIUM SERPL-SCNC: 139 MMOL/L (ref 136–145)
TIBC SERPL-MCNC: 426 MCG/DL (ref 298–536)
TRANSFERRIN SERPL-MCNC: 286 MG/DL (ref 200–360)
TRIGL SERPL-MCNC: 45 MG/DL (ref 0–150)
TSH SERPL DL<=0.05 MIU/L-ACNC: 2.51 UIU/ML (ref 0.27–4.2)
VIT B12 BLD-MCNC: 317 PG/ML (ref 211–946)
VLDLC SERPL-MCNC: 8 MG/DL (ref 5–40)
WBC NRBC COR # BLD AUTO: 3.46 10*3/MM3 (ref 3.4–10.8)

## 2025-03-06 PROCEDURE — 82306 VITAMIN D 25 HYDROXY: CPT | Performed by: NURSE PRACTITIONER

## 2025-03-06 PROCEDURE — 82746 ASSAY OF FOLIC ACID SERUM: CPT | Performed by: NURSE PRACTITIONER

## 2025-03-06 PROCEDURE — 84466 ASSAY OF TRANSFERRIN: CPT | Performed by: NURSE PRACTITIONER

## 2025-03-06 PROCEDURE — 82607 VITAMIN B-12: CPT | Performed by: NURSE PRACTITIONER

## 2025-03-06 PROCEDURE — 80050 GENERAL HEALTH PANEL: CPT | Performed by: NURSE PRACTITIONER

## 2025-03-06 PROCEDURE — 83540 ASSAY OF IRON: CPT | Performed by: NURSE PRACTITIONER

## 2025-03-06 PROCEDURE — 82728 ASSAY OF FERRITIN: CPT | Performed by: NURSE PRACTITIONER

## 2025-03-06 PROCEDURE — 80061 LIPID PANEL: CPT | Performed by: NURSE PRACTITIONER

## 2025-03-06 RX ORDER — BUPROPION HYDROCHLORIDE 100 MG/1
TABLET ORAL
Qty: 405 TABLET | Refills: 1 | Status: SHIPPED | OUTPATIENT
Start: 2025-03-06

## 2025-03-06 NOTE — ASSESSMENT & PLAN NOTE
Orders:    buPROPion (WELLBUTRIN) 100 MG tablet; Take 2 tablets by mouth Every Morning AND 0.5 tablets Daily With Lunch AND 2 tablets Every Evening.    Ambulatory Referral to Obstetrics / Gynecology

## 2025-03-06 NOTE — PROGRESS NOTES
Chief Complaint  Depression (States she feels like her hormones are off)    Subjective          Marie Stout presents to [facility]  History of Present Illness    History of Present Illness  The patient presents for evaluation of anxiety, depression, and hormonal imbalance.    She reports a slight improvement in her anxiety symptoms. She has been seizure-free recently and has not experienced any issues related to her medication. She has sufficient medication until Thursday and has requested a refill, which is expected to arrive today. She has an upcoming appointment with neurology in either August or September. Her current regimen includes Wellbutrin, administered as 2 doses in the morning, 2 in the evening, and a half dose at lunchtime.    She attributes her depressive symptoms to hormonal fluctuations. She has discontinued the use of birth control since December. She has been experiencing weight gain and difficulty losing it, as well as memory issues and brain fog. She is curious if her hormonal imbalances could be contributing to her anxiety and depression.    She maintains regular dental check-ups but has not had an eye examination recently. She reports a decline in her vision, which she believes may be due to prolonged computer use during her 12-hour work shifts. She acknowledges the need for an eye examination. She has not yet undergone a mammogram.    FAMILY HISTORY  She does not have a family history of breast cancer.    MEDICATIONS  Current: Wellbutrin, Klonopin, iron  Past: escitalopram, Cymbalta      Patient or patient representative verbalized consent for the use of Ambient Listening during the visit with  SHEYLA Rocha for chart documentation. 3/9/2025  08:41 EST      Depression: Not at risk (3/6/2025)    PHQ-2     PHQ-2 Score: 1    and     BMI is within normal parameters. No other follow-up for BMI required.         Allergies  Patient has no known  "allergies.    Social History     Tobacco Use    Smoking status: Never    Smokeless tobacco: Never   Vaping Use    Vaping status: Never Used   Substance Use Topics    Alcohol use: Never    Drug use: Never       Family History   Problem Relation Age of Onset    Stroke Mother     Hypertension Mother     Hyperlipidemia Mother     Diabetes Mother     Diabetes type II Mother     Cervical cancer Mother     Diabetes Father     Diabetes type II Father         Health Maintenance Due   Topic Date Due    ANNUAL PHYSICAL  Never done        Immunization History   Administered Date(s) Administered    COVID-19 (MODERNA) 1st,2nd,3rd Dose Monovalent 05/30/2021, 06/27/2021    Fluzone (or Fluarix & Flulaval for VFC) >6mos 10/12/2021, 09/29/2022    Influenza, Unspecified 10/01/2018, 10/01/2019, 10/01/2020    Tdap 01/23/2020       Review of Systems   Constitutional:  Negative for fatigue.   Respiratory:  Negative for cough and shortness of breath.    Cardiovascular:  Negative for chest pain.   Gastrointestinal:  Negative for diarrhea, nausea and vomiting.        Objective       Vitals:    03/06/25 0822   BP: 126/82   Pulse: 88   Resp: 16   Temp: 98.3 °F (36.8 °C)   SpO2: 100%   Weight: 52.3 kg (115 lb 4.8 oz)   Height: 157.5 cm (62\")       Body mass index is 21.09 kg/m².         Physical Exam  Vitals and nursing note reviewed.   Constitutional:       General: She is not in acute distress.     Appearance: Normal appearance. She is not ill-appearing.   HENT:      Right Ear: Tympanic membrane and ear canal normal.      Left Ear: Tympanic membrane and ear canal normal.      Nose: No congestion or rhinorrhea.      Mouth/Throat:      Pharynx: No oropharyngeal exudate or posterior oropharyngeal erythema.   Eyes:      Conjunctiva/sclera: Conjunctivae normal.   Neck:      Vascular: No carotid bruit.   Cardiovascular:      Rate and Rhythm: Normal rate and regular rhythm.      Heart sounds: No murmur heard.  Pulmonary:      Effort: Pulmonary effort " is normal.      Breath sounds: Normal breath sounds. No wheezing or rhonchi.   Musculoskeletal:      Cervical back: No tenderness.      Right lower leg: No edema.      Left lower leg: No edema.   Skin:     Findings: No bruising or rash.   Neurological:      General: No focal deficit present.      Mental Status: She is alert and oriented to person, place, and time. Mental status is at baseline.   Psychiatric:         Mood and Affect: Mood normal.         Behavior: Behavior normal.         Thought Content: Thought content normal.         Judgment: Judgment normal.           Physical Exam                Result Review :     The following data was reviewed by: SHEYLA Rocha on 03/06/2025:            No Images in the past 120 days found..         Results                      Assessment and Plan      Assessment & Plan  Generalized anxiety disorder      Orders:    buPROPion (WELLBUTRIN) 100 MG tablet; Take 2 tablets by mouth Every Morning AND 0.5 tablets Daily With Lunch AND 2 tablets Every Evening.    Ambulatory Referral to Obstetrics / Gynecology    History of vitamin D deficiency    Orders:    Vitamin D,25-Hydroxy    Mild episode of recurrent major depressive disorder           Iron deficiency anemia due to chronic blood loss  We will check her levels today.  Orders:    iron polysacch complex-B12-FA (FERREX 150 FORTE) 150-0.025-1 MG capsule capsule; Take 1 capsule by mouth Daily.    Iron Profile    Vitamin B12 & Folate    Ferritin    Non-refractory juvenile myoclonic epilepsy  Continue under the care of neurology       Decreased libido      Orders:    Ambulatory Referral to Obstetrics / Gynecology    Annual physical exam  ADVICE WAS GIVEN RE:  ALCOHOL USE, SEATBELT USE, REGULAR EXERCISE, HEALTHY DIET, ROUTINE EYE AND DENTAL EXAMS    Orders:    Comprehensive Metabolic Panel    CBC & Differential    TSH    Lipid Panel       Diagnosis Plan   1. History of vitamin D deficiency  Vitamin D,25-Hydroxy      2.  Generalized anxiety disorder  buPROPion (WELLBUTRIN) 100 MG tablet    Ambulatory Referral to Obstetrics / Gynecology      3. Mild episode of recurrent major depressive disorder        4. Iron deficiency anemia due to chronic blood loss  iron polysacch complex-B12-FA (FERREX 150 FORTE) 150-0.025-1 MG capsule capsule    Iron Profile    Vitamin B12 & Folate    Ferritin      5. Non-refractory juvenile myoclonic epilepsy        6. Decreased libido  Ambulatory Referral to Obstetrics / Gynecology      7. Annual physical exam  Comprehensive Metabolic Panel    CBC & Differential    TSH    Lipid Panel            Assessment & Plan  1. Anxiety.  Her anxiety appears to be improving with the current regimen of Wellbutrin (2 in the morning, 2 in the evening, and a half at lunchtime). She has not experienced any recent seizures and has enough medication until Thursday. A referral to Dr. Chel Cox will be made for further evaluation and management of her anxiety. If there is no communication regarding the referral within a 2-week period, she is advised to inform us via [employer].    2. Depression.  Her depression may be influenced by hormonal changes. A referral to Dr. Chel Cox will be made to address potential perimenopausal and menopausal symptoms contributing to her depression. If there is no communication regarding the referral within a 2-week period, she is advised to inform us via [employer].    3. Hormonal imbalance.  She reports symptoms that may be related to hormonal imbalances, including weight gain, decreased libido, and brain fog. A referral to Dr. Chel Cox will be made to evaluate and manage these symptoms. If there is no communication regarding the referral within a 2-week period, she is advised to inform us via [employer].    4. Health maintenance.  A mammogram has been ordered, and she is encouraged to schedule this at her earliest convenience. An eye examination is also recommended before her 's license  renewal in January 2027. Her iron levels will be checked today. If her iron levels are significantly low, a follow-up blood test may be necessary in 6 months.    Follow-up  The patient is scheduled for a follow-up visit in 6 months, or earlier if necessary.          Follow Up     Return in about 6 months (around 9/6/2025).    Patient was given instructions and counseling regarding her condition or for health maintenance advice. Please see specific information pulled into the AVS if appropriate.     Parts of this note are electronic transcriptions/translations of spoken language to printed text using the Dragon Dictation system.          Jackie Hinds, APRN  03/06/2025

## 2025-04-30 NOTE — PROGRESS NOTES
GYN Visit    CC: perimenopause    HPI:   45 y.o. Contraception or HRT: Vasectomy   Menses:   q 20-24 days, lasts 5 days, changes products q 2 hrs on heaviest days.   Pain:  Mild, OTC meds control discomfort    History: PMHx, Meds, Allergies, PSHx, Social Hx, and POBHx all reviewed and updated.    Pt is c/o longer, heavier periods as well as vasomotor symptoms c/w/ perimenopause.  Has had recent lab evaluation by PCP.  Patient is having hot flashes and night sweats as well as joint pain, brain fog, fatigue and difficulty sleeping.    PHYSICAL EXAM:  /79   Wt 51.1 kg (112 lb 9.6 oz)   BMI 20.59 kg/m²   General- NAD, alert and oriented, appropriate  Psych- Normal mood, good memory      ASSESSMENT AND PLAN:  Diagnoses and all orders for this visit:    1. Abnormal uterine bleeding (AUB) (Primary)  Assessment & Plan:  Patient with longer, heavier cycles that are happening every 20 to 24 days.  Lab work per PCP was normal.  Discussed clinical diagnosis of perimenopause.  Will check a pelvic ultrasound.  Discussed that in perimenopause one of the first options of treatment can be to consider low-dose OCPs.  They tend to regulate heavy cycles as well as treat initial vasomotor symptoms of perimenopause.  We discussed possible side effects such as decreased libido and weight gain that could be seen with OCPs.  Patient will start a trial of Low-Ogestrel    Orders:  -     US Non-ob Transvaginal; Future  -     norgestrel-ethinyl estradiol (LOW-OGESTREL,CRYSELLE) 0.3-30 MG-MCG per tablet; Take 1 tablet by mouth Daily.  Dispense: 84 tablet; Refill: 3    2. Perimenopause  Assessment & Plan:  Unseld patient regarding clinical diagnosis of perimenopause.  Discussed different hormonal treatments for perimenopause including first-line therapy with low-dose OCPs.  Can also do cyclic Prometrium versus nightly Prometrium.  And can transition to traditional MHT without waiting for clinical postmenopause.  At this time  patient would like to do a trial of low-dose OCPs to see if it will address perimenopause as well as AUB    Orders:  -     norgestrel-ethinyl estradiol (LOW-OGESTREL,CRYSELLE) 0.3-30 MG-MCG per tablet; Take 1 tablet by mouth Daily.  Dispense: 84 tablet; Refill: 3        Follow Up:  Return for post ultrasound.          Chel Cox MD  05/01/2025

## 2025-05-01 ENCOUNTER — OFFICE VISIT (OUTPATIENT)
Dept: OBSTETRICS AND GYNECOLOGY | Age: 45
End: 2025-05-01
Payer: COMMERCIAL

## 2025-05-01 VITALS — BODY MASS INDEX: 20.59 KG/M2 | SYSTOLIC BLOOD PRESSURE: 115 MMHG | DIASTOLIC BLOOD PRESSURE: 79 MMHG | WEIGHT: 112.6 LBS

## 2025-05-01 DIAGNOSIS — N93.9 ABNORMAL UTERINE BLEEDING (AUB): Primary | ICD-10-CM

## 2025-05-01 DIAGNOSIS — N95.1 PERIMENOPAUSE: ICD-10-CM

## 2025-05-01 NOTE — PATIENT INSTRUCTIONS
Come as You Are by Cora Rogers,   Come Together by Cora Rogers, PhD    [employer] personal lubricant

## 2025-05-08 NOTE — ASSESSMENT & PLAN NOTE
Unseld patient regarding clinical diagnosis of perimenopause.  Discussed different hormonal treatments for perimenopause including first-line therapy with low-dose OCPs.  Can also do cyclic Prometrium versus nightly Prometrium.  And can transition to traditional MHT without waiting for clinical postmenopause.  At this time patient would like to do a trial of low-dose OCPs to see if it will address perimenopause as well as AUB

## 2025-05-08 NOTE — ASSESSMENT & PLAN NOTE
Patient with longer, heavier cycles that are happening every 20 to 24 days.  Lab work per PCP was normal.  Discussed clinical diagnosis of perimenopause.  Will check a pelvic ultrasound.  Discussed that in perimenopause one of the first options of treatment can be to consider low-dose OCPs.  They tend to regulate heavy cycles as well as treat initial vasomotor symptoms of perimenopause.  We discussed possible side effects such as decreased libido and weight gain that could be seen with OCPs.  Patient will start a trial of Low-Ogestrel

## 2025-06-17 ENCOUNTER — PATIENT MESSAGE (OUTPATIENT)
Dept: OBSTETRICS AND GYNECOLOGY | Age: 45
End: 2025-06-17
Payer: COMMERCIAL

## 2025-07-18 ENCOUNTER — PATIENT MESSAGE (OUTPATIENT)
Dept: OBSTETRICS AND GYNECOLOGY | Age: 45
End: 2025-07-18
Payer: COMMERCIAL